# Patient Record
Sex: MALE | Employment: FULL TIME | URBAN - METROPOLITAN AREA
[De-identification: names, ages, dates, MRNs, and addresses within clinical notes are randomized per-mention and may not be internally consistent; named-entity substitution may affect disease eponyms.]

---

## 2017-10-31 ENCOUNTER — OFFICE VISIT - HEALTHEAST (OUTPATIENT)
Dept: FAMILY MEDICINE | Facility: CLINIC | Age: 32
End: 2017-10-31

## 2017-10-31 DIAGNOSIS — Z00.00 HEALTH CARE MAINTENANCE: ICD-10-CM

## 2017-10-31 DIAGNOSIS — D22.9 MULTIPLE NEVI: ICD-10-CM

## 2017-10-31 DIAGNOSIS — L30.9 DERMATITIS: ICD-10-CM

## 2017-10-31 LAB
CHOLEST SERPL-MCNC: 150 MG/DL
FASTING STATUS PATIENT QL REPORTED: YES
HDLC SERPL-MCNC: 31 MG/DL
LDLC SERPL CALC-MCNC: 84 MG/DL
TRIGL SERPL-MCNC: 175 MG/DL

## 2017-10-31 ASSESSMENT — MIFFLIN-ST. JEOR: SCORE: 2351.47

## 2017-11-03 ENCOUNTER — COMMUNICATION - HEALTHEAST (OUTPATIENT)
Dept: FAMILY MEDICINE | Facility: CLINIC | Age: 32
End: 2017-11-03

## 2017-11-03 LAB — TOTAL IGE - HISTORICAL: 228 KU/L (ref 0–100)

## 2017-11-15 ENCOUNTER — COMMUNICATION - HEALTHEAST (OUTPATIENT)
Dept: FAMILY MEDICINE | Facility: CLINIC | Age: 32
End: 2017-11-15

## 2018-02-26 ENCOUNTER — RECORDS - HEALTHEAST (OUTPATIENT)
Dept: ADMINISTRATIVE | Facility: OTHER | Age: 33
End: 2018-02-26

## 2019-01-21 ENCOUNTER — OFFICE VISIT - HEALTHEAST (OUTPATIENT)
Dept: FAMILY MEDICINE | Facility: CLINIC | Age: 34
End: 2019-01-21

## 2019-01-21 ENCOUNTER — COMMUNICATION - HEALTHEAST (OUTPATIENT)
Dept: TELEHEALTH | Facility: CLINIC | Age: 34
End: 2019-01-21

## 2019-01-21 DIAGNOSIS — L30.9 DERMATITIS: ICD-10-CM

## 2019-01-21 DIAGNOSIS — Z13.220 LIPID SCREENING: ICD-10-CM

## 2019-01-21 DIAGNOSIS — E66.09 CLASS 2 OBESITY DUE TO EXCESS CALORIES WITHOUT SERIOUS COMORBIDITY WITH BODY MASS INDEX (BMI) OF 37.0 TO 37.9 IN ADULT: ICD-10-CM

## 2019-01-21 DIAGNOSIS — E66.812 CLASS 2 OBESITY DUE TO EXCESS CALORIES WITHOUT SERIOUS COMORBIDITY WITH BODY MASS INDEX (BMI) OF 37.0 TO 37.9 IN ADULT: ICD-10-CM

## 2019-01-21 DIAGNOSIS — Z00.00 ROUTINE GENERAL MEDICAL EXAMINATION AT A HEALTH CARE FACILITY: ICD-10-CM

## 2019-01-21 DIAGNOSIS — Z13.1 DIABETES MELLITUS SCREENING: ICD-10-CM

## 2019-01-21 RX ORDER — TRIAMCINOLONE ACETONIDE 1 MG/G
CREAM TOPICAL 2 TIMES DAILY
Qty: 45 G | Refills: 1 | Status: SHIPPED | OUTPATIENT
Start: 2019-01-21 | End: 2022-09-22

## 2019-01-21 ASSESSMENT — MIFFLIN-ST. JEOR: SCORE: 2356.12

## 2019-01-23 ENCOUNTER — AMBULATORY - HEALTHEAST (OUTPATIENT)
Dept: LAB | Facility: CLINIC | Age: 34
End: 2019-01-23

## 2019-01-23 DIAGNOSIS — Z13.220 LIPID SCREENING: ICD-10-CM

## 2019-01-23 DIAGNOSIS — Z13.1 DIABETES MELLITUS SCREENING: ICD-10-CM

## 2019-01-23 LAB
CHOLEST SERPL-MCNC: 153 MG/DL
FASTING STATUS PATIENT QL REPORTED: YES
FASTING STATUS PATIENT QL REPORTED: YES
GLUCOSE BLD-MCNC: 62 MG/DL (ref 70–125)
HBA1C MFR BLD: 5.4 % (ref 3.5–6)
HDLC SERPL-MCNC: 36 MG/DL
LDLC SERPL CALC-MCNC: 85 MG/DL
TRIGL SERPL-MCNC: 161 MG/DL

## 2019-01-28 ENCOUNTER — COMMUNICATION - HEALTHEAST (OUTPATIENT)
Dept: FAMILY MEDICINE | Facility: CLINIC | Age: 34
End: 2019-01-28

## 2019-04-17 ENCOUNTER — OFFICE VISIT - HEALTHEAST (OUTPATIENT)
Dept: FAMILY MEDICINE | Facility: CLINIC | Age: 34
End: 2019-04-17

## 2019-04-17 DIAGNOSIS — N45.1 EPIDIDYMITIS: ICD-10-CM

## 2019-04-17 RX ORDER — IBUPROFEN 800 MG/1
800 TABLET, FILM COATED ORAL
Status: SHIPPED | COMMUNITY
Start: 2014-01-12 | End: 2022-09-22

## 2019-04-17 ASSESSMENT — MIFFLIN-ST. JEOR: SCORE: 2327.94

## 2020-01-07 ENCOUNTER — OFFICE VISIT - HEALTHEAST (OUTPATIENT)
Dept: ALLERGY | Facility: CLINIC | Age: 35
End: 2020-01-07

## 2020-01-07 DIAGNOSIS — Z01.82 ENCOUNTER FOR ALLERGY TESTING: ICD-10-CM

## 2020-01-07 DIAGNOSIS — L50.9 HIVES: ICD-10-CM

## 2020-01-07 ASSESSMENT — MIFFLIN-ST. JEOR: SCORE: 2340.13

## 2021-02-11 ENCOUNTER — RECORDS - HEALTHEAST (OUTPATIENT)
Dept: GENERAL RADIOLOGY | Facility: CLINIC | Age: 36
End: 2021-02-11

## 2021-02-11 ENCOUNTER — COMMUNICATION - HEALTHEAST (OUTPATIENT)
Dept: FAMILY MEDICINE | Facility: CLINIC | Age: 36
End: 2021-02-11

## 2021-02-11 ENCOUNTER — OFFICE VISIT - HEALTHEAST (OUTPATIENT)
Dept: FAMILY MEDICINE | Facility: CLINIC | Age: 36
End: 2021-02-11

## 2021-02-11 DIAGNOSIS — L98.9 DERMATOSIS: ICD-10-CM

## 2021-02-11 DIAGNOSIS — R22.32 LOCALIZED SWELLING OF FINGER OF LEFT HAND: ICD-10-CM

## 2021-02-11 DIAGNOSIS — Z00.00 ROUTINE GENERAL MEDICAL EXAMINATION AT A HEALTH CARE FACILITY: ICD-10-CM

## 2021-02-11 DIAGNOSIS — R22.32 LOCALIZED SWELLING, MASS AND LUMP, LEFT UPPER LIMB: ICD-10-CM

## 2021-02-11 DIAGNOSIS — R14.0 ABDOMINAL BLOATING: ICD-10-CM

## 2021-02-11 LAB
ALBUMIN SERPL-MCNC: 4.3 G/DL (ref 3.5–5)
ALP SERPL-CCNC: 79 U/L (ref 45–120)
ALT SERPL W P-5'-P-CCNC: 40 U/L (ref 0–45)
ANION GAP SERPL CALCULATED.3IONS-SCNC: 11 MMOL/L (ref 5–18)
AST SERPL W P-5'-P-CCNC: 28 U/L (ref 0–40)
BILIRUB SERPL-MCNC: 0.7 MG/DL (ref 0–1)
BUN SERPL-MCNC: 20 MG/DL (ref 8–22)
C REACTIVE PROTEIN LHE: 0.2 MG/DL (ref 0–0.8)
CALCIUM SERPL-MCNC: 9.8 MG/DL (ref 8.5–10.5)
CHLORIDE BLD-SCNC: 106 MMOL/L (ref 98–107)
CHOLEST SERPL-MCNC: 140 MG/DL
CO2 SERPL-SCNC: 26 MMOL/L (ref 22–31)
CREAT SERPL-MCNC: 1.01 MG/DL (ref 0.7–1.3)
ERYTHROCYTE [DISTWIDTH] IN BLOOD BY AUTOMATED COUNT: 12.5 % (ref 11–14.5)
ERYTHROCYTE [SEDIMENTATION RATE] IN BLOOD BY WESTERGREN METHOD: 1 MM/HR (ref 0–15)
FASTING STATUS PATIENT QL REPORTED: NO
GFR SERPL CREATININE-BSD FRML MDRD: >60 ML/MIN/1.73M2
GLUCOSE BLD-MCNC: 83 MG/DL (ref 70–125)
HCT VFR BLD AUTO: 47.6 % (ref 40–54)
HDLC SERPL-MCNC: 30 MG/DL
HGB BLD-MCNC: 17 G/DL (ref 14–18)
LDLC SERPL CALC-MCNC: 65 MG/DL
MCH RBC QN AUTO: 30.7 PG (ref 27–34)
MCHC RBC AUTO-ENTMCNC: 35.7 G/DL (ref 32–36)
MCV RBC AUTO: 86 FL (ref 80–100)
PLATELET # BLD AUTO: 213 THOU/UL (ref 140–440)
PMV BLD AUTO: 9.1 FL (ref 7–10)
POTASSIUM BLD-SCNC: 4.2 MMOL/L (ref 3.5–5)
PROT SERPL-MCNC: 7 G/DL (ref 6–8)
RBC # BLD AUTO: 5.53 MILL/UL (ref 4.4–6.2)
SODIUM SERPL-SCNC: 143 MMOL/L (ref 136–145)
TRIGL SERPL-MCNC: 226 MG/DL
WBC: 6.8 THOU/UL (ref 4–11)

## 2021-02-11 ASSESSMENT — MIFFLIN-ST. JEOR: SCORE: 1950.95

## 2021-02-12 LAB — ANA SER QL: 0.3 U

## 2021-02-26 ENCOUNTER — COMMUNICATION - HEALTHEAST (OUTPATIENT)
Dept: FAMILY MEDICINE | Facility: CLINIC | Age: 36
End: 2021-02-26

## 2021-02-26 ENCOUNTER — HOSPITAL ENCOUNTER (OUTPATIENT)
Dept: CT IMAGING | Facility: HOSPITAL | Age: 36
Discharge: HOME | End: 2021-02-26
Attending: FAMILY MEDICINE

## 2021-02-26 DIAGNOSIS — R14.0 ABDOMINAL BLOATING: ICD-10-CM

## 2021-03-02 ENCOUNTER — AMBULATORY - HEALTHEAST (OUTPATIENT)
Dept: FAMILY MEDICINE | Facility: CLINIC | Age: 36
End: 2021-03-02

## 2021-03-02 DIAGNOSIS — N28.1 RENAL CYST: ICD-10-CM

## 2021-03-02 DIAGNOSIS — K76.0 HEPATIC STEATOSIS: ICD-10-CM

## 2021-03-04 ENCOUNTER — HOSPITAL ENCOUNTER (OUTPATIENT)
Dept: ULTRASOUND IMAGING | Facility: HOSPITAL | Age: 36
Discharge: HOME | End: 2021-03-04
Attending: FAMILY MEDICINE

## 2021-03-04 DIAGNOSIS — N28.1 RENAL CYST: ICD-10-CM

## 2021-04-08 ENCOUNTER — AMBULATORY - HEALTHEAST (OUTPATIENT)
Dept: NURSING | Facility: CLINIC | Age: 36
End: 2021-04-08

## 2021-04-29 ENCOUNTER — AMBULATORY - HEALTHEAST (OUTPATIENT)
Dept: NURSING | Facility: CLINIC | Age: 36
End: 2021-04-29

## 2021-05-27 NOTE — PROGRESS NOTES
Assessment/Plan:    1. Epididymitis  Symptoms consistent with epididymitis on the left side.  Discussed given age less than 35 would consider treatment for gonorrhea/chlamydia with Rocephin in addition to doxycycline for 10 days; patient and wife both report monogamous relationship and that Rocephin is unnecessary and declined.  Provided printed information about epididymitis and prescription for doxycycline x10 days.  Will follow-up as needed for persistent symptoms.  - doxycycline (VIBRA-TABS) 100 MG tablet; Take 1 tablet (100 mg total) by mouth 2 (two) times a day for 10 days.  Dispense: 20 tablet; Refill: 0      Follow up: as needed    Adore Mg MD  [facility]    Subjective:    Patient ID: Kiran Arroyo is a 33 y.o. male is here today for swollen testicle and pain    Swollen testicle with pain  -started 3 days ago - in shower, noticed that left testicle was more swollen and tender  -pain is located more on the top  -no hx similar  -no recent trauma  -no dysuria  -no penile discharge  -no fevers  -some headache, possibly from lack of sleep    Patient Active Problem List   Diagnosis     Obesity     Past Medical History:   Diagnosis Date     Obesity 9/9/2016     Past Surgical History:   Procedure Laterality Date     WISDOM TOOTH EXTRACTION       Current Outpatient Medications on File Prior to Visit   Medication Sig Dispense Refill     ibuprofen (ADVIL,MOTRIN) 800 MG tablet Take 800 mg by mouth.       triamcinolone (KENALOG) 0.1 % cream Apply topically 2 (two) times a day. Lightly to affected area 45 g 1     No current facility-administered medications on file prior to visit.      No Known Allergies  Social History     Socioeconomic History     Marital status:      Spouse name: Not on file     Number of children: Not on file     Years of education: Not on file     Highest education level: Not on file   Occupational History     Not on file   Social Needs     Financial resource strain:  "Not on file     Food insecurity:     Worry: Not on file     Inability: Not on file     Transportation needs:     Medical: Not on file     Non-medical: Not on file   Tobacco Use     Smoking status: Never Smoker     Smokeless tobacco: Never Used   Substance and Sexual Activity     Alcohol use: No     Drug use: No     Sexual activity: Yes     Partners: Female   Lifestyle     Physical activity:     Days per week: Not on file     Minutes per session: Not on file     Stress: Not on file   Relationships     Social connections:     Talks on phone: Not on file     Gets together: Not on file     Attends Latter day service: Not on file     Active member of club or organization: Not on file     Attends meetings of clubs or organizations: Not on file     Relationship status: Not on file     Intimate partner violence:     Fear of current or ex partner: Not on file     Emotionally abused: Not on file     Physically abused: Not on file     Forced sexual activity: Not on file   Other Topics Concern     Not on file   Social History Narrative     Not on file     Review of systems is as stated in HPI, and the remainder of system review is otherwise negative.    Objective:      /80   Pulse 90   Ht 6' 3\" (1.905 m)   Wt (!) 289 lb 5 oz (131.2 kg)   BMI 36.16 kg/m      General appearance: awake, NAD, accompanied by wife  HEENT: atraumatic, normocephalic, no scleral icterus or injection, ears and nose grossly normal, moist mucous membranes  Lungs: breathing comfortably on room air  : normal external male genitalia, left testicle mildly swollen compared to right, mild tenderness of left epididymis  Neuro: alert, oriented x3, CNs grossly intact, no focal deficits appreciated  Psych: normal mood/affect/behavior, answering questions appropriately, linear thought process    "

## 2021-05-31 VITALS — HEIGHT: 75 IN | BODY MASS INDEX: 36.62 KG/M2 | WEIGHT: 294.5 LBS

## 2021-06-02 VITALS — BODY MASS INDEX: 36.85 KG/M2 | WEIGHT: 296.4 LBS | HEIGHT: 75 IN

## 2021-06-03 VITALS — HEIGHT: 75 IN | WEIGHT: 289.31 LBS | BODY MASS INDEX: 35.97 KG/M2

## 2021-06-04 VITALS
WEIGHT: 292 LBS | SYSTOLIC BLOOD PRESSURE: 118 MMHG | BODY MASS INDEX: 36.31 KG/M2 | HEART RATE: 98 BPM | HEIGHT: 75 IN | DIASTOLIC BLOOD PRESSURE: 74 MMHG

## 2021-06-05 VITALS
DIASTOLIC BLOOD PRESSURE: 80 MMHG | SYSTOLIC BLOOD PRESSURE: 124 MMHG | WEIGHT: 206.2 LBS | HEIGHT: 75 IN | OXYGEN SATURATION: 99 % | HEART RATE: 90 BPM | BODY MASS INDEX: 25.64 KG/M2

## 2021-06-05 NOTE — PATIENT INSTRUCTIONS - HE
Hives up to 6 weeks after initial event    If happens again, record all events of previous 2-6 hours    Dermatographia    Zyrtec 10 mg (cetirizine)

## 2021-06-05 NOTE — PROGRESS NOTES
Chief complaint: Hives    History of present illness: This is a pleasant 34-year-old gentleman that I was asked to see by Onur Cortez in regards to hives.  He states a few weeks ago he was working at [employer].  He works there as an  overnights.  He got off work around 6 AM.  He states he was trying to hurry and collect his stools.  He is sweating.  It was cold outside and then he went into his car and turned on the warm heat.  He then noted some itching on his head.  He went home and took a shower.  He is been told in the past that he has keratosis pilaris and states that when he rubs up against things sometimes this is aggravated and elevates.  He states that typically the cold helps this.  He took a shower and the itching worsened.  He went to bed and then started to develop hives.  His wife gave him 2 Benadryl.  He had a slight tickle in his throat and some drainage.  He could talk, however.  No wheezing but felt that he was slightly short of breath.  He went to the emergency room exam was normal except for urticaria.  His eyes were swollen face was swollen on the way there but did improve with the Benadryl.  He was given steroids and watched for a period of time and symptoms did improve.  He had hives come and go up to about 3 days after discharge.  He states that night he can think of nothing else unusual that he would have done.  He did not take any over-the-counter medications.  He did have some chili around midnight.  He was not sick at the time and denies any previous episodes of hives.  He does note that sometimes he has a rash that appears in the flexor surfaces of his elbows but he did not have this at the time.  He denies any nasal congestion or drainage at that time.    Past medical history: Otherwise unremarkable    Social history: He is an , has a rabbit at home but had no interaction with the rabbit before the hives appeared, non-smoking environment,  "non-smoker, no exposure to mold at home    Family history: Negative for urticaria    Review of Systems performed as above and the remainder is negative.         Current Outpatient Medications:      EPINEPHrine (EPIPEN/ADRENACLICK/AUVI-Q) 0.3 mg/0.3 mL injection, Inject 0.3 mL (0.3 mg total) as directed as needed for anaphylaxis. Inject into thigh., Disp: 2 Pre-filled Pen Syringe, Rfl: 0     ibuprofen (ADVIL,MOTRIN) 800 MG tablet, Take 800 mg by mouth., Disp: , Rfl:      triamcinolone (KENALOG) 0.1 % cream, Apply topically 2 (two) times a day. Lightly to affected area, Disp: 45 g, Rfl: 1    No Known Allergies    /74   Pulse 98   Ht 6' 3\" (1.905 m)   Wt (!) 292 lb (132.5 kg)   BMI 36.50 kg/m    Gen: Pleasant male not in acute distress  HEENT: Eyes no erythema of the bulbar or palpebral conjunctiva, no edema. Ears: TMs well visualized, no effusions. Nose: No congestion, mucosa normal. Mouth: Throat clear, no lip or tongue edema.   Cardiac: Regular rate and rhythm, no murmurs, rubs or gallops  Respiratory: Clear to auscultation bilaterally, no adventitious breath sounds  Lymph: No supraclavicular or cervical lymphadenopathy  Skin: Dermatographia  Psych: Alert and oriented times 3    Last Percutaneous Allergy Test Results  Trees  Gaurav, White  1:20 H  (W/F in mm): 0 (01/07/20 1453)  Birch Mix 1:20 H (W/F in mm): 0 (01/07/20 1453)  Knott, Common 1:20 H (W/F in mm): 0 (01/07/20 1453)  Elm, American 1:20 H (W/F in mm): 0 (01/07/20 1453)  [place], Shagbark 1:20 H (W/F in mm): 0 (01/07/20 1453)  Maple, Hard/Sugar 1:20 H (W/F in mm): 0 (01/07/20 1453)  [place] Mix 1:20 H (W/F in mm): 0 (01/07/20 1453)  [place], Red 1:20 H (W/F in mm): 0 (01/07/20 1453)  [place], American 1:20 H (W/F in mm): 0 (01/07/20 1453)  Aldrich Tree 1:20 H (W/F in mm): 0 (01/07/20 1453)  Dust Mites  D. Pteronyssinus Mite 30,000 AU/ML H (W/F in mm): 0 (01/07/20 1453)  D. Farinae Mite 30,000 AU/ML H (W/F in mm: 0 (01/07/20 1453)  Grasses  Grass " Mix #4 10,000 BAU/ML H: 0 (01/07/20 1453)  [place] Grass 1:20 H (W/F in mm): 0 (01/07/20 1453)  Cockroach  Cockroach Mix 1:10 H (W/F in mm): 0 (01/07/20 1453)  Molds/Fungi  Alternaria Tenuis 1:10 H (W/F in mm): 0 (01/07/20 1453)  Aspergillus Fumigatus 1:10 H (W/F in mm): 0 (01/07/20 1453)  Homodendrum Cladosporioides 1:10 H (W/F in mm): 0 (01/07/20 1453)  Penicillin Notatum 1:10 H (W/F in mm): 0 (01/07/20 1453)  Epicoccum 1:10 H (W/F in mm): 0 (01/07/20 1453)  Weeds  Ragweed, Short 1:20 H (W/F in mm): 0 (01/07/20 1453)  [place], [place] 1:20 H (W/F in mm): 0 (01/07/20 1453)  Lamb's Quarter 1:20 H (W/F in mm): 0 (01/07/20 1453)  Pigweed, Rough Red Root 1:20 H  (W/F in mm): 0 (01/07/20 1453)  Plantain, English 1:20 H  (W/F in mm): 0 (01/07/20 1453)  Sagebrush, Mugwort 1:20 H  (W/F in mm): 0 (01/07/20 1453)  Animal  Cat 10,000 BAU/ML H (W/F in mm): 0 (01/07/20 1453)  Dog 1:10 H (W/F in mm): 0 (01/07/20 1453)  Controls  Device Type: QUINTIP (01/07/20 1453)  Neg. control: 50% Glycerine/Saline H (W/F in mm): 0/0 (01/07/20 1453)  Pos. control: Histamine 6mg/ML (W/F in mms): 12/21 (01/07/20 1453)      Impression report and plan:  1.  Acute urticaria    This episode of urticaria appears to be unexplained.  Likely viral in origin, however, this is a diagnosis of exclusion.  I went over triggers for urticaria.  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  Explained this to him.  Okay to use cetirizine as needed.  Hives can persist up to 6 weeks after the initial event.  If hives persist beyond 6 weeks, he needs to let me know.  No further work-up at this time.  He should record all of the events of the previous 2 to 6 hours if the hives do recur.  Follow as needed.

## 2021-06-13 NOTE — PROGRESS NOTES
Assessment/Plan:     1. Health care maintenance  Age appropriate health care screening and guidance discussed including nutrition, exercise, immunization updates and vitamin supplementation.   Screening labs and exams ordered below.   - Influenza, Seasonal,Quad Inj, 36+ MOS  - Tdap vaccine,  6yo or older,  IM  - Glucose; Future  - Lipid [place] FASTING  - Glucose    2. Multiple nevi  3. Dermatitis  We will check IgE as below if elevated may consider allergy panel.  Advised patient to keep a log of food and what he is using.  Will try steroid.  Can try Benadryl or Claritin over-the-counter.  Referral to dermatology per his request due to concern for nevi however none look particularly concerning on my evaluation.  - Immunoglobulin E  - Ambulatory referral to Dermatology  - triamcinolone (KENALOG) 0.1 % cream; Apply topically 2 (two) times a day. Lightly to affected area  Dispense: 45 g; Refill: 1    Briefly discussed with patient and wife they are somewhat considering vasectomy.  If he decides should plan to follow-up with Dr. Pa.    The following high BMI interventions were performed this visit: encouragement to exercise and weight loss from baseline weight    Subjective:      Kiran Arroyo is a 32 y.o. male comes in today for annual physical.  He feels well overall good energy.  No concerns with bowel or bladder.  He has an active job and has good exercise tolerance.  Blood pressure is controlled.  Reviewed last labs which were normal with fasting for labs today.  He states that he has been intermittently getting a rash.  He states that it typically happens in the winter.  He thinks it is friction from close is usually on his stomach.  He noticed that if his wife washes his work uniform other than having it monitored at the facility it will help.  Also wearing a T-shirt helps.  He had to recently start wearing long sleeves so now it has spread down his arms he does describe it is itchy.  It is worse than  "it was last night.  No open sores no history of eczema or psoriasis.  He has not put anything on it other than lotions.  He tries to wear fragrance free lotions and creams.  He also is noticing some small black spots coming on his back states that they just are popping up in the last year they do not bother him but he is concerned about them.  He is having another child.  It was unexpected pregnancy.  They are considering their options he is somewhat considering vasectomy but is hesitant.  Wife is here at appointment today.    Current Outpatient Prescriptions   Medication Sig Dispense Refill     triamcinolone (KENALOG) 0.1 % cream Apply topically 2 (two) times a day. Lightly to affected area 45 g 1     No current facility-administered medications for this visit.      No Known Allergies  Past Medical History, Family History, and Social History reviewed.  Past Medical History:   Diagnosis Date     Obesity 9/9/2016     No past surgical history on file.  Review of patient's allergies indicates no known allergies.  Family History   Problem Relation Age of Onset     No Medical Problems Mother      Diabetes Maternal Grandmother      Breast cancer Maternal Grandmother      Cancer Paternal Grandmother      Colon cancer Paternal Grandfather      Social History     Social History     Marital status:      Spouse name: N/A     Number of children: N/A     Years of education: N/A     Occupational History     Not on file.     Social History Main Topics     Smoking status: Never Smoker     Smokeless tobacco: Not on file     Alcohol use No     Drug use: No     Sexual activity: Yes     Partners: Female     Other Topics Concern     Not on file     Social History Narrative         Review of systems is as stated in HPI, and the remainder of the 10 system review is otherwise negative.    Objective:     Vitals:    10/31/17 1007   BP: 114/80   Pulse: 84   Weight: (!) 294 lb 8 oz (133.6 kg)   Height: 6' 3\" (1.905 m)    Body mass index " is 36.81 kg/(m^2).  Wt Readings from Last 3 Encounters:   10/31/17 (!) 294 lb 8 oz (133.6 kg)   09/09/16 (!) 289 lb 6.4 oz (131.3 kg)       General Appearance:    Alert, cooperative, no distress, appears stated age    Head:    Normocephalic, without obvious abnormality, atraumatic   Eyes:    PERRL, EOM's intact, no conjunctivitis    Ears:    Normal TM's and external ear canals   Nose:   Mucosa normal, no drainage     or sinus tenderness   Throat:   Oropharynx is clear   Neck:   Supple, symmetrical, no adenopathy, no thyromegally, no carotid bruit        Lungs:     Clear to auscultation bilaterally, respirations unlabored   Chest Wall:    No tenderness or deformity    Heart:    Regular rate and rhythm, S1 and S2 normal, no murmur, rub    or gallop       Abdomen:     Soft, non-tender, bowel sounds active all four quadrants,     no masses, no organomegaly           Extremities:   Extremities normal, atraumatic, no cyanosis or edema   Pulses:   2+ and symmetric all extremities   Neuro:   cranial nerves grossly intact, grossly normal sensation and reflexes in extremities    Psych:   grossly normal mood and affect without acute anxiety or psychosis    Skin:  Patient has small maculopapular rash on arms and lower part of abdomen.  There is slight hyperpigmented small macules on back.  No lesions are significantly concerning for malignancy.  Otherwise no rashes or lesions   :          This note has been dictated using voice recognition software. Any grammatical or context distortions are unintentional and inherent to the software.

## 2021-06-15 NOTE — PROGRESS NOTES
ASSESSMENT/PLAN:  1. Routine general medical examination at a health care facility  Healthy 34 yo male.  Encourage healthy eating and exercise. Fasting glucose and lipid cascade are ordered  - Lipid [place] FASTING    2. Localized swelling of finger of left hand  Laboratory workup for conditions causing finger swelling including RA are all negative, xray does not reveal significant arthritis.  Reassurance is provided, return if sxs recur  - HM2(CBC w/o Differential)  - Antinuclear Antibody (STUART) Cascade  - Sedimentation Rate  - C-Reactive Protein(CRP)  - XR Hand Left 3 or More VWS; Future    3. Dermatosis      4. Abdominal bloating  Unusual abdominal bloating. Differential includes constipation, dietary intolerance, weight gain. Less likely malignancy.  With gradual onset and notable distention on exam, will order labs and CT.    - Comprehensive Metabolic Panel  - CT Abdomen With Oral With IV Contrast; Future      Orders Placed This Encounter   Procedures     XR Hand Left 3 or More VWS     Standing Status:   Future     Number of Occurrences:   1     Standing Expiration Date:   2/11/2022     Order Specific Question:   Reason for Exam (Describe Symptoms):     Answer:   3rd digit swelling,  joint pains     Order Specific Question:   Can the procedure be changed per Radiologist protocol?     Answer:   Yes     CT Abdomen With Oral With IV Contrast     Standing Status:   Future     Standing Expiration Date:   2/11/2022     Order Specific Question:   Reason for Exam (Describe Symptoms):     Answer:   abodminal bloating, tender right abdomen, periumbilical     Order Specific Question:   Can the procedure be changed per Radiologist protocol?     Answer:   Yes     Order Specific Question:   If this is a diagnostic procedure, have the patient's age and recent imaging history been considered?     Answer:   Yes     Influenza, Seasonal Quad, PF =/> 6months     HM2(CBC w/o Differential)     Lipid [place] FASTING     Order Specific  Question:   Fasting is required?     Answer:   Yes     Antinuclear Antibody (STUART) Cascade     Sedimentation Rate     C-Reactive Protein(CRP)     Comprehensive Metabolic Panel     There are no discontinued medications.      CHIEF COMPLAINT:  Chief Complaint   Patient presents with     Annual Exam     Finger Swelling     Left Middle finger       HISTORY OF PRESENT ILLNESS:  Kiran is a 35 y.o. male presenting to the clinic today for an annual exam. Overall he is doing well.  He did have an episode of middle finger swelling on right hand that was quite notable. No injury, still mildly swollen and painful.  No history of similar symptoms, no other joint involvement or family history of rheumatologic conditions.  Now noting abdominal swelling, has been occurring gradually over the last couple months. He is uncomfortable in his abdomen. Denies, diarrhea, constipation, or blood in his stools. No family hx colon cancer    Remainder of 12-point ROS is negative.    Social History     Tobacco Use   Smoking Status Never Smoker   Smokeless Tobacco Never Used       Family History   Problem Relation Age of Onset     No Medical Problems Mother      Diabetes Maternal Grandmother      Breast cancer Maternal Grandmother      Cancer Paternal Grandmother      Colon cancer Paternal Grandfather        Social History     Socioeconomic History     Marital status:      Spouse name: Not on file     Number of children: Not on file     Years of education: Not on file     Highest education level: Not on file   Occupational History     Not on file   Social Needs     Financial resource strain: Not on file     Food insecurity     Worry: Not on file     Inability: Not on file     Transportation needs     Medical: Not on file     Non-medical: Not on file   Tobacco Use     Smoking status: Never Smoker     Smokeless tobacco: Never Used   Substance and Sexual Activity     Alcohol use: No     Drug use: No     Sexual activity: Yes     Partners:  "Female   Lifestyle     Physical activity     Days per week: Not on file     Minutes per session: Not on file     Stress: Not on file   Relationships     Social connections     Talks on phone: Not on file     Gets together: Not on file     Attends Rastafari service: Not on file     Active member of club or organization: Not on file     Attends meetings of clubs or organizations: Not on file     Relationship status: Not on file     Intimate partner violence     Fear of current or ex partner: Not on file     Emotionally abused: Not on file     Physically abused: Not on file     Forced sexual activity: Not on file   Other Topics Concern     Not on file   Social History Narrative     Not on file       Past Surgical History:   Procedure Laterality Date     WISDOM TOOTH EXTRACTION         No Known Allergies    Active Ambulatory Problems     Diagnosis Date Noted     Obesity 09/09/2016     Dermatosis 02/11/2021     Resolved Ambulatory Problems     Diagnosis Date Noted     No Resolved Ambulatory Problems     No Additional Past Medical History       VITALS:  Vitals:    02/11/21 1115   BP: 124/80   Patient Site: Right Arm   Patient Position: Sitting   Cuff Size: Adult Large   Pulse: 90   SpO2: 99%   Weight: 206 lb 3.2 oz (93.5 kg)   Height: 6' 3\" (1.905 m)     Wt Readings from Last 3 Encounters:   02/11/21 206 lb 3.2 oz (93.5 kg)   01/07/20 (!) 292 lb (132.5 kg)   12/29/19 (!) 292 lb (132.5 kg)     Body mass index is 25.77 kg/m .    PHYSICAL EXAM:  General Appearance: Alert, cooperative, no distress, appears stated age  Head: Normocephalic, without obvious abnormality, atraumatic  Eyes: PERRL, conjuctiva/corneas clear, EPM's intact, fundi benign, both eyes  Ears: Normal TM's and external ear canals, both ears  Nose: Nares normal, septum midline, mucosa normal, no drainage or sinus tenderness  Throat: Lips, mucosa, and tongue normal; teeth and gums normal  Neck: Supple, symmetrical, trachea midline, no adenopathy;      thyroid: " No enlargements/tenderness/nodules; no carotid bruit or JVD  Back: Symmetric, no curvature, ROM normal, no CVA tenderness  Lungs: Clear to auscultation bilaterally, respirations unlabored  Chest Wall: No tenderness or deformity  Heart: Regular rate and rhythm, S1 and S2 normal, no murmur, rub or gallop  Abdomen: Soft, generally tender with distention questionable mass vs fullness in upper abdomen.  Extremities: Extremities normal, atraumatic, no cyanosis or edema  Pulses: 2+ and symmetric all extremities  Skin: Skin color, texture, turgor normal, no rashes or lesions  Lymph Nodes: cervical, supraclavicular, and axillary nodes normal  Neurologic: CNIII-XII intact. Normal strength, sensation and reflexes       throughout    RECENT RESULTS  No results found for this or any previous visit (from the past 48 hour(s)).    MEDICATIONS:  Current Outpatient Medications   Medication Sig Dispense Refill     EPINEPHrine (EPIPEN/ADRENACLICK/AUVI-Q) 0.3 mg/0.3 mL injection Inject 0.3 mL (0.3 mg total) as directed as needed for anaphylaxis. Inject into thigh. 2 Pre-filled Pen Syringe 0     ibuprofen (ADVIL,MOTRIN) 800 MG tablet Take 800 mg by mouth.       triamcinolone (KENALOG) 0.1 % cream Apply topically 2 (two) times a day. Lightly to affected area 45 g 1     No current facility-administered medications for this visit.

## 2021-06-15 NOTE — TELEPHONE ENCOUNTER
----- Message from Milagros Knox MD sent at 2/11/2021 12:37 PM CST -----  Your xray of your hand is normal- no chronic arthritis or bony abnormalities

## 2021-06-15 NOTE — TELEPHONE ENCOUNTER
I reached out to pt and no answer. I left a vm. If pt returns call, please relay result message below from Dr. Knox.

## 2021-06-16 PROBLEM — K76.0 HEPATIC STEATOSIS: Status: ACTIVE | Noted: 2021-03-02

## 2021-06-16 PROBLEM — L98.9 DERMATOSIS: Status: ACTIVE | Noted: 2021-02-11

## 2021-06-17 NOTE — PATIENT INSTRUCTIONS - HE
Patient Instructions by Adore Mg MD at 4/17/2019  2:40 PM     Author: Adore Mg MD Service: -- Author Type: Physician    Filed: 4/17/2019  3:05 PM Encounter Date: 4/17/2019 Status: Signed    : Adore Mg MD (Physician)       Patient Education     Epididymitis  Inflammation of the epididymis can cause pain and swelling in your scrotum. The epididymis is a small tube next to the testicle that stores sperm. Epididymitis is usually caused by an infection. In sexually active men, it is often caused by a sexually transmitted disease (STD) such as chlamydia or gonorrhea. In boys and in men over 40, it can be from bacteria from other parts of the urinary tract (not an STD infection).  Symptoms may begin with pain in the lower belly (abdomen) or low back. The pain then spreads down into the scrotum. Usually only one side is affected. The testicle and scrotum swell and become very painful and red. You may have fever and a burning when passing urine. Sometimes you may have a discharge from the penis.  Treatment is with antibiotics, and anti-inflammatory and pain medicines. The condition should get better over the first few days of treatment. But it will take several weeks for all the swelling and discomfort to go away. If your healthcare provider suspects that an STD is the cause, your sexual partners may need to be treated.  Home care  The following will help you care for yourself at home:    Support the scrotum. When lying down, place a rolled towel under the scrotum. When walking, use an athletic supporter or 2 pairs of jockey-style underwear.    To relieve pain, put ice packs on the inflamed area. You can make your own ice pack by putting ice cubes in a sealed plastic bag wrapped in a thin towel.    You may use over-the-counter medicines to control pain, unless another medicine was given. If you have chronic liver or kidney disease, talk with your healthcare provider before taking these  medicines. Also talk with your provider if you've ever had a stomach ulcer or GI bleeding.    Rest in bed for the first few days until the fever, pain, and swelling get better. It may take several weeks for all of the swelling to go away.    Constipation can make you strain. This makes the pain worse. Avoid constipation by eating natural laxatives such as prunes, fresh fruits, and whole-grain cereals. If necessary, use a mild over-the-counter laxative for constipation. Mineral oil can be used to keep the stools soft.    Do not have sex until you have finished all treatment and all symptoms have cleared.    Take all medicine as directed. Do not miss any doses and do not stop early even if you feel better.  Follow-up care  Follow up with your healthcare provider, or as advised, to be sure you are responding properly to treatment. If a culture was taken, you may call for the result as directed. A culture test can ensure that you are on the correct antibiotic.   When to seek medical advice  Call your healthcare provider right away if any of these occur:    Fever of 100.4 F (38 C), or as directed by your healthcare provider    Increasing pain or swelling of the testicle after starting treatment    Pressure or pain in your bladder that gets worse    Unable to pass urine for 8 hours

## 2021-06-18 NOTE — LETTER
Letter by Cassandra Trinh CNP at      Author: Cassandra Trinh CNP Service: -- Author Type: --    Filed:  Encounter Date: 1/28/2019 Status: (Other)       Kiran Arroyo  [address] [place] [address]             January 28, 2019         Dear Mr. Arroyo,    Below are the results from your recent visit:    Resulted Orders   Glucose   Result Value Ref Range    Glucose 62 (L) 70 - 125 mg/dL    Patient Fasting > 8hrs? Yes     Narrative    Fasting Glucose reference range is 70-99 mg/dL per  American Diabetes Association (ADA) guidelines.   Lipid Cascade   Result Value Ref Range    Cholesterol 153 <=199 mg/dL    Triglycerides 161 (H) <=149 mg/dL    HDL Cholesterol 36 (L) >=40 mg/dL    LDL Calculated 85 <=129 mg/dL    Patient Fasting > 8hrs? Yes    Glycosylated Hemoglobin A1c   Result Value Ref Range    Hemoglobin A1c 5.4 3.5 - 6.0 %       Your blood sugar is a little low.  I recommend consuming small snacks every 3-4 hours to maintain a healthy blood sugar.  I checked a hemoglobin A1C (your average blood sugar over 3 months) and you do not have diabetes.      Your triglycerides are mildly elevated.  Increasing your activity level and avoiding alcohol will help lower this.      Please call with questions or contact us using [employer].    Sincerely,        Electronically signed by Cassandra Trinh CNP

## 2021-06-23 NOTE — PROGRESS NOTES
Assessment and Plan:     1. Routine general medical examination at a health care facility  Discussed consuming a healthy diet and exercising.  He is up-to-date on vaccinations.    2. Diabetes mellitus screening  Check fasting glucose and A1c.  - Glucose; Future  - Glycosylated Hemoglobin A1c; Future    3. Lipid screening  He will follow-up for fasting labs.  - Lipid Cascade; Future    4. Dermatitis  Patient has had a rash the dermatology diagnosis keratosis pilaris.  He finds improvement with triamcinolone cream that he uses as needed.  - triamcinolone (KENALOG) 0.1 % cream; Apply topically 2 (two) times a day. Lightly to affected area  Dispense: 45 g; Refill: 1    5. Class 2 obesity due to excess calories without serious comorbidity with body mass index (BMI) of 37.0 to 37.9 in adult  The following high BMI interventions were performed this visit: encouragement to exercise and lifestyle education regarding diet      Subjective:     Kiran is a 33 y.o. male presenting to the clinic for a female physical.  Patient has been  for 9-1/2 years.  He has 3 children ages 13, 9, 1.  The oldest is a boy and the other two children are girls. His wife is currently pregnant.  They are trying to have another boy.  He has no concerns for STDs.  He denies consuming a healthy diet.  He has not been exercising.  He works as an  for [employer].  Patient saw dermatology in the past for a rash suspected to be keratosis pilaris.  Patient will have intermittent symptoms which improved with hydrocortisone cream.  He is not fasting today but would like to follow-up for fasting labs.  He has a family history of diabetes.    Review of Systems: A complete 14 point review of systems was obtained and is negative or as stated in the history of present illness.    Social History     Socioeconomic History     Marital status:      Spouse name: Not on file     Number of children: Not on file     Years of  "education: Not on file     Highest education level: Not on file   Social Needs     Financial resource strain: Not on file     Food insecurity - worry: Not on file     Food insecurity - inability: Not on file     Transportation needs - medical: Not on file     Transportation needs - non-medical: Not on file   Occupational History     Not on file   Tobacco Use     Smoking status: Never Smoker   Substance and Sexual Activity     Alcohol use: No     Drug use: No     Sexual activity: Yes     Partners: Female   Other Topics Concern     Not on file   Social History Narrative     Not on file       Active Ambulatory Problems     Diagnosis Date Noted     Obesity 09/09/2016     Resolved Ambulatory Problems     Diagnosis Date Noted     No Resolved Ambulatory Problems     Past Medical History:   Diagnosis Date     Obesity 9/9/2016       Family History   Problem Relation Age of Onset     No Medical Problems Mother      Diabetes Maternal Grandmother      Breast cancer Maternal Grandmother      Cancer Paternal Grandmother      Colon cancer Paternal Grandfather        Objective:     Pulse 84   Ht 6' 2.75\" (1.899 m)   Wt (!) 296 lb 6.4 oz (134.4 kg)   SpO2 98%   BMI 37.30 kg/m      General Appearance:    Alert, cooperative, no distress, appears stated age   Head:    Normocephalic, without obvious abnormality, atraumatic   Eyes:    PERRL, conjunctiva/corneas clear, EOM's intact, fundi     benign, both eyes        Ears:    Normal TM's and external ear canals, both ears   Nose:   Nares normal, septum midline, mucosa normal, no drainage    or sinus tenderness   Throat:   Lips, mucosa, and tongue normal; teeth and gums normal   Neck:   Supple, symmetrical, trachea midline, no adenopathy;        thyroid:  No enlargement/tenderness/nodules; no carotid    bruit or JVD   Back:     Symmetric, no curvature, ROM normal, no CVA tenderness   Lungs:     Clear to auscultation bilaterally, respirations unlabored   Chest wall:    No tenderness or " deformity   Heart:    Regular rate and rhythm, S1 and S2 normal, no murmur, rub    or gallop   Abdomen:     Soft, non-tender, bowel sounds active all four quadrants,     no masses, no organomegaly   Genitalia:    Deferred per patient    Rectal:    deferred   Extremities:   Extremities normal, atraumatic, no cyanosis or edema   Pulses:   2+ and symmetric all extremities   Skin:   Skin color, texture, turgor normal, no rashes or lesions   Lymph nodes:   Cervical, supraclavicular, and axillary nodes normal   Neurologic:   CNII-XII intact. Normal strength, sensation and reflexes       throughout

## 2021-06-26 ENCOUNTER — HEALTH MAINTENANCE LETTER (OUTPATIENT)
Age: 36
End: 2021-06-26

## 2021-10-11 ENCOUNTER — HEALTH MAINTENANCE LETTER (OUTPATIENT)
Age: 36
End: 2021-10-11

## 2022-03-27 ENCOUNTER — HEALTH MAINTENANCE LETTER (OUTPATIENT)
Age: 37
End: 2022-03-27

## 2022-09-22 ENCOUNTER — OFFICE VISIT (OUTPATIENT)
Dept: FAMILY MEDICINE | Facility: CLINIC | Age: 37
End: 2022-09-22
Payer: COMMERCIAL

## 2022-09-22 VITALS
TEMPERATURE: 99.3 F | OXYGEN SATURATION: 97 % | RESPIRATION RATE: 14 BRPM | HEART RATE: 87 BPM | WEIGHT: 301.5 LBS | HEIGHT: 75 IN | SYSTOLIC BLOOD PRESSURE: 128 MMHG | DIASTOLIC BLOOD PRESSURE: 86 MMHG | BODY MASS INDEX: 37.49 KG/M2

## 2022-09-22 DIAGNOSIS — L29.3 PENILE PRURITUS: Primary | ICD-10-CM

## 2022-09-22 PROCEDURE — 87529 HSV DNA AMP PROBE: CPT | Performed by: FAMILY MEDICINE

## 2022-09-22 PROCEDURE — 99213 OFFICE O/P EST LOW 20 MIN: CPT | Performed by: FAMILY MEDICINE

## 2022-09-22 RX ORDER — CLOTRIMAZOLE 1 %
CREAM (GRAM) TOPICAL 2 TIMES DAILY
Qty: 30 G | Refills: 0 | Status: CANCELLED | OUTPATIENT
Start: 2022-09-22

## 2022-09-22 ASSESSMENT — PAIN SCALES - GENERAL: PAINLEVEL: NO PAIN (0)

## 2022-09-22 NOTE — PATIENT INSTRUCTIONS
You can try clotrimazole over the counter twice daily.    Clean the area with water and try to keep it dry.    For itching or pain you can apply topical 1% hydrocortisone over the counter twice a day for no long than 1 week at a time.

## 2022-09-22 NOTE — PROGRESS NOTES
Assessment & Plan   1. Penile pruritus: patient is a 37 year-old uncircumcised male without significant medical history presenting for penile pruritus with associated rash. Based on HPI, ROS, and physical exam differential diagnoses include but are not limited to balanitis, HSV, etc. low suspicion for STI including gonorrhea, chlamydia, and syphilis causing current symptoms. Will check for HSV, results pending. Discussed adequate hygiene with use of warm water. Offered use over-the-counter clotrimazole and controlled use of 1% corticosteroid cream twice daily for 5-7 days if itching/pain is persistent. Patient understands and is agreeable to plan.   - Herpes Simplex Virus 1&2 by PCR    Return in about 2 weeks (around 10/6/2022), or if symptoms worsen or fail to improve.    Eddy Butt MD  [facility]    This chart is completed utilizing dictation software; typos and/or incorrect word substitutions may unintentionally occur.     Juliano Beck is a 37 year old presenting for the following health issues:  Red spots on the tip of Penis (Has had this before in his early 20's treated with cortizone)    History of Present Illness       Reason for visit:  Possible signs of irritation of the penis head  Symptom onset:  3-7 days ago  Symptom intensity:  Moderate  Symptom progression:  Staying the same  Had these symptoms before:  Yes  Has tried/received treatment for these symptoms:  Yes  Previous treatment was successful:  No  What makes it worse:  Redness increasing  What makes it better:  No    He eats 0-1 servings of fruits and vegetables daily.He consumes 1 sweetened beverage(s) daily.He exercises with enough effort to increase his heart rate 10 to 19 minutes per day.  He exercises with enough effort to increase his heart rate 3 or less days per week. He is missing 4 dose(s) of medications per week.     Presents with concerns of red, itchy, and slightly painful penile head  "for the past week. Patient first noted redness with spots on the tip of his penis roughly 1 weeks ago while he was at work going to the bathroom. He reports that when he was urinating he experiencing pain and burning on the tip of his penis, believes it was due to urine contact to skin. He does not believe that the head of his penis is swollen.     He is in a monogenous relationship with his wife. States that he had experienced similar symptoms in his early 20s but self-resolved.     Has been trying to clean the area initially with soap.  He was concerned this may be worsening the [place] and has since to use baby wipes and simply water.    Denies abdominal pain, constipation, diarrhea, nausea, vomiting, testicular pain, and penile discharge.     Review of Systems   CONSTITUTIONAL: NEGATIVE for fever, chills, change in weight  INTEGUMENTARY/SKIN: NEGATIVE for worrisome moles or lesions and POSITIVE for rash penile head  ENT/MOUTH: NEGATIVE for ear, mouth and throat problems  RESP: NEGATIVE for significant cough or SOB  CV: NEGATIVE for chest pain, palpitations or peripheral edema  GI: NEGATIVE for nausea, abdominal pain, heartburn, or change in bowel habits  : negative for dysuria, hematuria, decreased urinary stream, erectile dysfunction and positive for pruritis of penile head and mild burning of skin following urination  PSYCHIATRIC: NEGATIVE for changes in mood or affect   ROS otherwise negative      Objective    /86   Pulse 87   Temp 99.3  F (37.4  C) (Temporal)   Resp 14   Ht 1.915 m (6' 3.39\")   Wt 136.8 kg (301 lb 8 oz)   SpO2 97%   BMI 37.29 kg/m    Body mass index is 37.29 kg/m .  Physical Exam   GENERAL: healthy, alert and no distress.  Accompanied by son.  RESP: lungs clear to auscultation - no rales, rhonchi or wheezes  CV: regular rate and rhythm, normal S1 S2, no S3 or S4, no murmur, click or rub, no peripheral edema and peripheral pulses strong   (male): Intermittent papules over " the glans of the penis and foreskin.  Some appear to be vesicular, while others appear ruptured and with ulcerations.  Otherwise normal male genitalia without lesions or urethral discharge, no hernia  PSYCH: mentation appears normal, affect normal/bright    Labs: pending

## 2022-09-23 LAB
HSV1 DNA SPEC QL NAA+PROBE: NOT DETECTED
HSV2 DNA SPEC QL NAA+PROBE: NOT DETECTED

## 2022-09-23 NOTE — RESULT ENCOUNTER NOTE
Please inform of results if patient has not viewed in [employer] within 3 business days.    Your herpes test was normal.     I would treat this as Balanitis as discussed     Please call the clinic with any questions you may have.     Have a great day,    Dr. Agustin

## 2022-09-25 ENCOUNTER — HEALTH MAINTENANCE LETTER (OUTPATIENT)
Age: 37
End: 2022-09-25

## 2023-04-19 ENCOUNTER — OFFICE VISIT (OUTPATIENT)
Dept: FAMILY MEDICINE | Facility: CLINIC | Age: 38
End: 2023-04-19
Payer: COMMERCIAL

## 2023-04-19 VITALS
DIASTOLIC BLOOD PRESSURE: 88 MMHG | OXYGEN SATURATION: 97 % | HEART RATE: 94 BPM | SYSTOLIC BLOOD PRESSURE: 130 MMHG | TEMPERATURE: 98.1 F | BODY MASS INDEX: 36.47 KG/M2 | WEIGHT: 299.5 LBS | RESPIRATION RATE: 14 BRPM | HEIGHT: 76 IN

## 2023-04-19 DIAGNOSIS — K59.01 SLOW TRANSIT CONSTIPATION: ICD-10-CM

## 2023-04-19 DIAGNOSIS — E66.812 CLASS 2 OBESITY DUE TO EXCESS CALORIES WITHOUT SERIOUS COMORBIDITY WITH BODY MASS INDEX (BMI) OF 36.0 TO 36.9 IN ADULT: ICD-10-CM

## 2023-04-19 DIAGNOSIS — Z11.3 SCREEN FOR STD (SEXUALLY TRANSMITTED DISEASE): ICD-10-CM

## 2023-04-19 DIAGNOSIS — T78.40XA ALLERGIC REACTION, INITIAL ENCOUNTER: ICD-10-CM

## 2023-04-19 DIAGNOSIS — K76.0 HEPATIC STEATOSIS: ICD-10-CM

## 2023-04-19 DIAGNOSIS — Z00.00 ROUTINE GENERAL MEDICAL EXAMINATION AT A HEALTH CARE FACILITY: Primary | ICD-10-CM

## 2023-04-19 DIAGNOSIS — L71.9 ROSACEA: ICD-10-CM

## 2023-04-19 DIAGNOSIS — E66.09 CLASS 2 OBESITY DUE TO EXCESS CALORIES WITHOUT SERIOUS COMORBIDITY WITH BODY MASS INDEX (BMI) OF 36.0 TO 36.9 IN ADULT: ICD-10-CM

## 2023-04-19 LAB
ALBUMIN SERPL-MCNC: 4.3 G/DL (ref 3.4–5)
ALP SERPL-CCNC: 82 U/L (ref 40–150)
ALT SERPL W P-5'-P-CCNC: 55 U/L (ref 0–70)
ANION GAP SERPL CALCULATED.3IONS-SCNC: 6 MMOL/L (ref 3–14)
AST SERPL W P-5'-P-CCNC: 36 U/L (ref 0–45)
BILIRUB SERPL-MCNC: 0.9 MG/DL (ref 0.2–1.3)
BUN SERPL-MCNC: 13 MG/DL (ref 7–30)
C TRACH DNA SPEC QL NAA+PROBE: NEGATIVE
CALCIUM SERPL-MCNC: 9.4 MG/DL (ref 8.5–10.1)
CHLORIDE BLD-SCNC: 105 MMOL/L (ref 94–109)
CHOLEST SERPL-MCNC: 163 MG/DL
CO2 SERPL-SCNC: 26 MMOL/L (ref 20–32)
CREAT SERPL-MCNC: 0.88 MG/DL (ref 0.66–1.25)
FASTING STATUS PATIENT QL REPORTED: YES
GFR SERPL CREATININE-BSD FRML MDRD: >90 ML/MIN/1.73M2
GLUCOSE BLD-MCNC: 93 MG/DL (ref 70–99)
HCV AB SERPL QL IA: NONREACTIVE
HDLC SERPL-MCNC: 37 MG/DL
HIV 1+2 AB+HIV1 P24 AG SERPL QL IA: NONREACTIVE
LDLC SERPL CALC-MCNC: 102 MG/DL
N GONORRHOEA DNA SPEC QL NAA+PROBE: NEGATIVE
NONHDLC SERPL-MCNC: 126 MG/DL
POTASSIUM BLD-SCNC: 4 MMOL/L (ref 3.4–5.3)
PROT SERPL-MCNC: 8 G/DL (ref 6.8–8.8)
SODIUM SERPL-SCNC: 137 MMOL/L (ref 133–144)
T PALLIDUM AB SER QL: NONREACTIVE
TRIGL SERPL-MCNC: 120 MG/DL
TSH SERPL DL<=0.005 MIU/L-ACNC: 0.64 MU/L (ref 0.4–4)

## 2023-04-19 PROCEDURE — 86803 HEPATITIS C AB TEST: CPT | Performed by: FAMILY MEDICINE

## 2023-04-19 PROCEDURE — 87591 N.GONORRHOEAE DNA AMP PROB: CPT | Performed by: FAMILY MEDICINE

## 2023-04-19 PROCEDURE — 80061 LIPID PANEL: CPT | Performed by: FAMILY MEDICINE

## 2023-04-19 PROCEDURE — 86780 TREPONEMA PALLIDUM: CPT | Performed by: FAMILY MEDICINE

## 2023-04-19 PROCEDURE — 99395 PREV VISIT EST AGE 18-39: CPT | Mod: 25 | Performed by: FAMILY MEDICINE

## 2023-04-19 PROCEDURE — 87491 CHLMYD TRACH DNA AMP PROBE: CPT | Performed by: FAMILY MEDICINE

## 2023-04-19 PROCEDURE — 36415 COLL VENOUS BLD VENIPUNCTURE: CPT | Performed by: FAMILY MEDICINE

## 2023-04-19 PROCEDURE — 87389 HIV-1 AG W/HIV-1&-2 AB AG IA: CPT | Performed by: FAMILY MEDICINE

## 2023-04-19 PROCEDURE — 84443 ASSAY THYROID STIM HORMONE: CPT | Performed by: FAMILY MEDICINE

## 2023-04-19 PROCEDURE — 80053 COMPREHEN METABOLIC PANEL: CPT | Performed by: FAMILY MEDICINE

## 2023-04-19 PROCEDURE — 99214 OFFICE O/P EST MOD 30 MIN: CPT | Mod: 25 | Performed by: FAMILY MEDICINE

## 2023-04-19 RX ORDER — EPINEPHRINE 0.3 MG/.3ML
0.3 INJECTION SUBCUTANEOUS PRN
Qty: 2 EACH | Refills: 1 | Status: SHIPPED | OUTPATIENT
Start: 2023-04-19

## 2023-04-19 ASSESSMENT — ENCOUNTER SYMPTOMS
SORE THROAT: 0
PALPITATIONS: 0
HEMATURIA: 0
DIARRHEA: 1
HEADACHES: 1
MYALGIAS: 1
HEMATOCHEZIA: 0
CHILLS: 0
ARTHRALGIAS: 1
HEARTBURN: 1
COUGH: 0
NAUSEA: 0
WEAKNESS: 0
FREQUENCY: 0
FEVER: 0
NERVOUS/ANXIOUS: 0
DYSURIA: 0
CONSTIPATION: 0
DIZZINESS: 0
JOINT SWELLING: 1
SHORTNESS OF BREATH: 0
EYE PAIN: 0
PARESTHESIAS: 0
ABDOMINAL PAIN: 1

## 2023-04-19 ASSESSMENT — PAIN SCALES - GENERAL: PAINLEVEL: NO PAIN (0)

## 2023-04-19 NOTE — RESULT ENCOUNTER NOTE
Please inform of results if patient has not viewed in [employer] within 3 business days.    Your std screen was normal.    Please call the clinic with any questions you may have.     Have a great day,    Dr. Agustin

## 2023-04-19 NOTE — RESULT ENCOUNTER NOTE
"Please inform of results if patient has not viewed in [employer] within 3 business days.    Lipids - Your \"bad\" cholesterol was elevated. This can be improved with diet and exercise. All animal based foods such as meat, dairy, and eggs contain cholesterol. All plant based foods such as fruits, nuts, and vegetables do not.    You do not need a medication for this at this time.    TSH - Your thyroid lab results were normal.    Your syphilis test was normal.    CMP Results - Your blood sugar was 93. Your kidney function, electrolytes, and liver function were normal.    Please call the clinic with any questions you may have.     Have a great day,    Dr. Agustin"

## 2023-04-19 NOTE — PROGRESS NOTES
SUBJECTIVE:   CC: Kiran is an 37 year old who presents for preventative health visit.       4/19/2023     9:44 AM   Additional Questions   Roomed by Michael DUNN   Accompanied by n/a   Patient has been advised of split billing requirements and indicates understanding: Yes  Healthy Habits:     Getting at least 3 servings of Calcium per day:  Yes    Bi-annual eye exam:  Yes    Dental care twice a year:  Yes    Sleep apnea or symptoms of sleep apnea:  Daytime drowsiness and Excessive snoring    Diet:  Low salt, Diabetic, Carbohydrate counting and Other    Frequency of exercise:  2-3 days/week    Duration of exercise:  30-45 minutes    Taking medications regularly:  Yes    Medication side effects:  Not applicable    PHQ-2 Total Score: 1    Additional concerns today:  Yes (fatty liver concern. abdominal pain that he wants to talk about. annual blood work)    Right sided abdominal pain/bloating. Improved by BM. No blood in stool or melena. No prior surgeries. Occurs 1-2 times monthly.    Has not tried anything for this yet.    Needs refill of his EpiPen.    Would like to review previous diagnosis of hepatic steatosis.    Interested in STD screening.    Would like to see a specialist for his rosacea.    Today's PHQ-2 Score:       4/19/2023     9:23 AM   PHQ-2 ( 1999 [employer])   Q1: Little interest or pleasure in doing things 1   Q2: Feeling down, depressed or hopeless 0   PHQ-2 Score 1   Q1: Little interest or pleasure in doing things Several days   Q2: Feeling down, depressed or hopeless Not at all   PHQ-2 Score 1       Have you ever done Advance Care Planning? (For example, a Health Directive, POLST, or a discussion with a medical provider or your loved ones about your wishes): No, advance care planning information given to patient to review.  Patient plans to discuss their wishes with loved ones or provider.      Social History     Tobacco Use     Smoking status: Never     Smokeless tobacco: Never   Vaping Use     Vaping  status: Never Used     Passive vaping exposure: Yes   Substance Use Topics     Alcohol use: No         4/19/2023     9:22 AM   Alcohol Use   Prescreen: >3 drinks/day or >7 drinks/week? No       Last PSA: No results found for: PSA    Reviewed orders with patient. Reviewed health maintenance and updated orders accordingly - Yes  Lab work is in process  BP Readings from Last 3 Encounters:   04/19/23 130/88   09/22/22 128/86   02/11/21 124/80    Wt Readings from Last 3 Encounters:   04/19/23 135.9 kg (299 lb 8 oz)   09/22/22 136.8 kg (301 lb 8 oz)   02/11/21 93.5 kg (206 lb 3.2 oz)                  Patient Active Problem List   Diagnosis     Obesity     Dermatosis     Hepatic steatosis     Impingement syndrome of right shoulder     Sprain of right acromioclavicular joint     Past Surgical History:   Procedure Laterality Date     WISDOM TOOTH EXTRACTION         Social History     Tobacco Use     Smoking status: Never     Smokeless tobacco: Never   Vaping Use     Vaping status: Never Used     Passive vaping exposure: Yes   Substance Use Topics     Alcohol use: No     Family History   Problem Relation Age of Onset     No Known Problems Mother      Diabetes Maternal Grandmother      Breast Cancer Maternal Grandmother      Cancer Paternal Grandmother      Colon Cancer Paternal Grandfather          Current Outpatient Medications   Medication Sig Dispense Refill     EPINEPHrine (ANY BX GENERIC EQUIV) 0.3 MG/0.3ML injection 2-pack Inject 0.3 mLs (0.3 mg) into the muscle as needed for anaphylaxis 2 each 1     No Known Allergies    Reviewed and updated as needed this visit by clinical staff   Tobacco  Allergies  Meds  Problems  Med Hx  Surg Hx  Fam Hx          Reviewed and updated as needed this visit by Provider   Tobacco  Allergies  Meds  Problems  Med Hx  Surg Hx  Fam Hx       Social history reviewed  Past Medical History:   Diagnosis Date     Obesity 9/9/2016      Past Surgical History:   Procedure Laterality  "Date     WISDOM TOOTH EXTRACTION       Review of Systems   Constitutional: Negative for chills and fever.   HENT: Negative for congestion, ear pain and sore throat.    Eyes: Negative for pain and visual disturbance.   Respiratory: Negative for cough and shortness of breath.    Cardiovascular: Positive for peripheral edema. Negative for chest pain and palpitations.   Gastrointestinal: Positive for abdominal pain, diarrhea and heartburn. Negative for constipation, hematochezia and nausea.   Genitourinary: Negative for dysuria, frequency, genital sores, hematuria, impotence, penile discharge and urgency.   Musculoskeletal: Positive for arthralgias, joint swelling and myalgias.   Skin: Positive for rash.   Neurological: Positive for headaches. Negative for dizziness, weakness and paresthesias.   Psychiatric/Behavioral: Positive for mood changes. The patient is not nervous/anxious.      OBJECTIVE:   /88   Pulse 94   Temp 98.1  F (36.7  C) (Temporal)   Resp 14   Ht 1.918 m (6' 3.5\")   Wt 135.9 kg (299 lb 8 oz)   SpO2 97%   BMI 36.94 kg/m      Physical Exam  GENERAL: Obese male.  Healthy, alert and no distress  EYES: Eyes grossly normal to inspection, PERRL and conjunctivae and sclerae normal  HENT: ear canals and TM's normal, nose and mouth without ulcers or lesions  NECK: no adenopathy, no asymmetry, masses, or scars and thyroid normal to palpation  RESP: lungs clear to auscultation - no rales, rhonchi or wheezes  CV: regular rate and rhythm, normal S1 S2, no S3 or S4, no murmur, click or rub, no peripheral edema and peripheral pulses strong  ABDOMEN: soft, nontender, no hepatosplenomegaly, no masses and bowel sounds normal  MS: no gross musculoskeletal defects noted, no edema  SKIN: Rosacea present.  No suspicious lesions or rashes over exposed surfaces.  NEURO: Normal strength and tone, mentation intact and speech normal  PSYCH: mentation appears normal, affect normal/bright    Labs: " pending    ASSESSMENT/PLAN:   1. Routine general medical examination at a health care facility: Discussed personal health and safety. Routine screenings as below. Appropriate anticipatory guidance, vaccinations, and health screening recommendations delivered according to the USPSTF and other appropriate society guidelines.  Patient understands and is agreeable with the plan ordered below.  - Lipid panel reflex to direct LDL Non-fasting; Future  - Comprehensive metabolic panel (BMP + Alb, Alk Phos, ALT, AST, Total. Bili, TP); Future  - HIV Antigen Antibody Combo; Future  - Hepatitis C antibody; Future  - Neisseria gonorrhoeae PCR; Future  - Chlamydia trachomatis PCR; Future  - Treponema Abs w Reflex to RPR and Titer; Future  - TSH with free T4 reflex; Future    2. Allergic reaction, initial encounter: Refills given.  - EPINEPHrine (ANY BX GENERIC EQUIV) 0.3 MG/0.3ML injection 2-pack; Inject 0.3 mLs (0.3 mg) into the muscle as needed for anaphylaxis  Dispense: 2 each; Refill: 1    3. Hepatic steatosis: Encourage weight loss.  Monitor LFTs.  - Lipid panel reflex to direct LDL Non-fasting; Future  - Comprehensive metabolic panel (BMP + Alb, Alk Phos, ALT, AST, Total. Bili, TP); Future    4. Slow transit constipation: Use Metamucil.  No red flag signs.  If not proving should return visit.  Not currently actively having symptoms.  Encourage weight loss.  Check labs as below.  - Comprehensive metabolic panel (BMP + Alb, Alk Phos, ALT, AST, Total. Bili, TP)  - TSH with free T4 reflex    5. Screen for STD (sexually transmitted disease):   - HIV Antigen Antibody Combo  - Hepatitis C antibody  - Neisseria gonorrhoeae PCR  - Chlamydia trachomatis PCR  - Treponema Abs w Reflex to RPR and Titer    6. Rosacea: Referral given as desired.  - Adult Dermatology Referral; Future    7. Class 2 obesity due to excess calories without serious comorbidity with body mass index (BMI) of 36.0 to 36.9 in adult: Encourage diet and exercise  "changes for overall health improvement. Referral given.  - Nutrition Referral; Future      COUNSELING:   Reviewed preventive health counseling, as reflected in patient instructions       Regular exercise       Healthy diet/nutrition       Vision screening       Hearing screening       Consider Hep C screening for all patients one time for ages 18-79 years       Syphilis screening for high risk patients        HIV screeninx in teen years, 1x in adult years, and at intervals if high risk      BMI:   Estimated body mass index is 36.94 kg/m  as calculated from the following:    Height as of this encounter: 1.918 m (6' 3.5\").    Weight as of this encounter: 135.9 kg (299 lb 8 oz).   Weight management plan: Discussed healthy diet and exercise guidelines    He reports that he has never smoked. He has never used smokeless tobacco.    Eddy Butt MD  [facility]    Disclaimer: This note consists of symbols derived from keyboarding, dictation and/or voice recognition software. As a result, there may be errors in the script that have gone undetected. Please consider this when interpreting information found in this chart.    "

## 2023-04-19 NOTE — PATIENT INSTRUCTIONS
Use over the counter metamucil.    Watch for fevers, increasing pain, lack of bowel movements, black tarry stools, or blood in your stools.      Preventive Health Recommendations  Male Ages 26 - 39    Yearly exam:             See your health care provider every year in order to  o   Review health changes.   o   Discuss preventive care.    o   Review your medicines if your doctor has prescribed any.  You should be tested each year for STDs (sexually transmitted diseases), if you re at risk.   After age 35, talk to your provider about cholesterol testing. If you are at risk for heart disease, have your cholesterol tested at least every 5 years.   If you are at risk for diabetes, you should have a diabetes test (fasting glucose).  Shots: Get a flu shot each year. Get a tetanus shot every 10 years.     Nutrition:  Eat at least 5 servings of fruits and vegetables daily.   Eat whole-grain bread, whole-wheat pasta and brown rice instead of white grains and rice.   Get adequate Calcium and Vitamin D.     Lifestyle  Exercise for at least 150 minutes a week (30 minutes a day, 5 days a week). This will help you control your weight and prevent disease.   Limit alcohol to one drink per day.   No smoking.   Wear sunscreen to prevent skin cancer.   See your dentist every six months for an exam and cleaning.

## 2023-04-21 ENCOUNTER — VIRTUAL VISIT (OUTPATIENT)
Dept: NUTRITION | Facility: CLINIC | Age: 38
End: 2023-04-21
Attending: FAMILY MEDICINE
Payer: COMMERCIAL

## 2023-04-21 DIAGNOSIS — Z00.00 ROUTINE GENERAL MEDICAL EXAMINATION AT A HEALTH CARE FACILITY: ICD-10-CM

## 2023-04-21 DIAGNOSIS — E66.812 CLASS 2 OBESITY DUE TO EXCESS CALORIES WITHOUT SERIOUS COMORBIDITY WITH BODY MASS INDEX (BMI) OF 36.0 TO 36.9 IN ADULT: Primary | ICD-10-CM

## 2023-04-21 DIAGNOSIS — E66.09 CLASS 2 OBESITY DUE TO EXCESS CALORIES WITHOUT SERIOUS COMORBIDITY WITH BODY MASS INDEX (BMI) OF 36.0 TO 36.9 IN ADULT: Primary | ICD-10-CM

## 2023-04-21 PROCEDURE — 97802 MEDICAL NUTRITION INDIV IN: CPT | Mod: VID | Performed by: DIETITIAN, REGISTERED

## 2023-04-21 NOTE — LETTER
4/21/2023         RE: Kiran Arroyo  [id number] [facility] [id number]        Dear Dr. Butt,    Thank you for referring your patient, Kiran Arroyo, to the nutrition services at [facility]. I enjoyed meeting with Kiran and creating some wellness goals to help address his weight concerns. I provided some meal plans and recipes along with other nutrition resources related to healthy sleep patterns.  I look forward following up with him and monitoring his progress with quality sleep and nutrition at our follow up consult. Please see a copy of my visit note for more details.       Again, thank you for allowing me to participate in the care of your patient.        Sincerely,        Ave Johnson, RD, LD, CLT

## 2023-04-21 NOTE — PROGRESS NOTES
Medical Nutrition Therapy  Visit Type: Initial assessment and intervention    Visit Details    How would you like to obtain your AVS? mychart   If the correspondence for visit is dropped, how would you like your dietitian to reconnect with you:   call back by phone? Yes   Will anyone else be joining your video visit or telephone call? no5    Type of service:  Video Visit    Start Time: 9:00am    End Time: 10:00am    Originating Location (pt. Location): home    Distant Location (provider location):  [facility] virtual off-site    Platform used for Video Visit: adriana       Referring Provider: Eddy Butt  [facility]     REASON FOR REFERRAL:   Kiran Arroyo is a 37 year old male who is interested in Medical Nutrition Therapy (MNT) and education related to weight management. Would like a better understanding of how his diet can benefit him. He works nights and tries to function the best during the day for his family. He is eating on the go constantly. Trying to function with a normal life with night shitf. He works 10 hour days. The day before his work scheudle. Eats at 7am - rice, beans, chicken, hotdog, spaghetti (any dinner leftovers from day before)  and tries to take power nap before going in. He eats lunch at work 12pm-2am sometimes skips and just goes till eating at home. 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 He will than sleep about 4-5 hours and snack at work. Peanut butter sandwich, leftovers, poptart. When at home normally eats a sandwich or goes out to eat.He doesn't know if he is messing up his metabolism with this schedule. Should he still eat normally sometimes sleeps right after eating.   He is accompanied by self        NUTRITION ASSESSMENT:           4/21/2023     8:17 AM   Neurological   Migraine Headaches Past              4/21/2023     8:17 AM   Gastrointestinal   Diarrhea Past   Gas/bloating Past   Abdominal  Pain Past           4/21/2023     8:17 AM   Food Sensitivities   Lactose intolerance Current          4/21/2023     8:17 AM   Endocrine   Overweight/obesity Current          4/21/2023     8:17 AM   Skin   Acne Current   Hives Current          4/21/2023     8:17 AM   Cardiopulmonary   High Cholesterol Current        Past Medical History:  Past Medical History:   Diagnosis Date     Obesity 9/9/2016       Previous Surgeries:   Past Surgical History:   Procedure Laterality Date     WISDOM TOOTH EXTRACTION          Family History:  Family History   Problem Relation Age of Onset     No Known Problems Mother      Diabetes Maternal Grandmother      Breast Cancer Maternal Grandmother      Cancer Paternal Grandmother      Colon Cancer Paternal Grandfather         Lifestyle History:      4/21/2023     8:17 AM   Lifestyle   Do you feel your life is stressful right now?  Yes   What is the cause(s) of stress in your life?  Work;Health Concerns;Multi-tasking and multiple deadlines to meet;Over thinking and too much analysis   Are you currently implementing any strategies to help manage stress? Yes   What are you doing to manage stress?  TV;Take time for self        Exercise History:      4/21/2023     8:17 AM   Exercise   Does your occupation require extended periods of sitting?  Yes   Does your occupation require extended periods of repetitive movements (ex: walking or lifting)?  Yes   Do you currently participate in any forms of exercise? Yes   Check all the exercises you participate in: Walking;Weight Lifting;Other Cardio   How many times per week do you exercise? 2 times   How long do you usually exercise? 30 min        Sleep History:      4/21/2023     8:17 AM   Sleep   How many hours (on average) do you sleep per night? 4-6   What time do you turn off the lights? 10 PM   How long does it take for you to fall asleep? 30-45 mins   What time do you stop using electronic devices? 10 PM   What time do you wake up? 3 PM   When do  you eat your first meal?  7 PM   Do you feel well-rested during the day?  No   Do you take naps?  Yes   Do you have a comfortable bedroom environment (cool, quiet, dark, etc)? Yes   Do you have a sleep routine/ ritual that you do before bed?  Yes   How many hours do you spend per day looking at a screen (TV, computer, tablet and phone)? 0 to 2   Select all factors that apply to your current sleep habits: Difficulty falling asleep;Wake up in the middle of the night;Sleep eating;Snore loudly or have been told you stop breathing;Eat large meals within 3 hours of going to bed;Night sweats;No control over nighttime eating        Nutrition History:      4/21/2023     8:17 AM   Nutrition   Have you ever had a nutrition consultation? Yes   Do you currently follow a special diet or nutritional program? No   What do you feel are the biggest barriers getting in the way of achieving you nutritional goals? Motivation/Readiness to change;Lack of prep/cooking skills;Work (such as lack of time to eat, unhealthy choices, etc.);Lack of control over emotions/behaviors   Do you have any food allergies, sensitivities or intolerances?  No           4/21/2023     8:17 AM   Digestion   Do you experience stomach pains/cramping? Monthly   Do you experience bloating?  Monthly   Do you experience gas?  2-3 times per week   Do you experience heartburn/acid reflux/indigestion? Weekly   How often do you have a bowel movement? Every other day   What is a typical bowel movement like for you? Select all that apply: Varies a lot          4/21/2023     8:17 AM   Food Access:    Who does the grocery shopping? Spouse/Partner   How often is grocery shopping done? 2 times per week   Where do you usually receive your groceries from? Select all that apply: Walmart;Target;[employer];Costco;[employer];Aldi's;Whole Foods   Do you read food labels? Yes   What do you look at?  Calories;Organic;Fats;Carbs;Fiber;Sugar   Who does the cooking? Select all that apply:  Self;Spouse/Partner   How many meals do you eat out per week?  3 to 5   What restaurants do you typically choose? Bar/Grills          4/21/2023     8:17 AM   Daily Patterns:   How many days per week do you have breakfast? 4   How many days per week do you have lunch? 4   How many days per week do you have dinner? 5   How many days per week do you have snacks? 5          4/21/2023     8:17 AM   Protein Intake:   How many times per day do you typically consume a protein source(s)? 5 or more   What types of protein do you currently eat?  Ground Beef;Beef Ribs;Beef Roast;Beef Hot Dogs;Pork Chops;Pork Ribs;Diaz/Zimbabwean Diaz;Sausage;Other Pork;[place];Tuna;Shrimp;Lobster;Other Fish;Ground Cervantes;Cervantes Chops;Chicken Breast;Ground Chicken;Chicken Sausage;Other Chicken;Turkey Breast;Turkey Thighs;Turkey Diaz           4/21/2023     8:17 AM   Fat Intake:    How many times per day do you typically consume healthy fat(s)? 5 or more   What types of health fats do you currently eat? Select all that apply:  Almonds;Walnuts;Other nuts           4/21/2023     8:17 AM   Fruit Intake:    How many times per day do you typically consume fruits? 3   What types of fruit do currently eat? Apple;Banana;Pears           4/21/2023     8:17 AM   Vegetable Intake:    How many times per day do you typically consume vegetables? 2   What types of vegetables do you currently eat? Asparagus;Beans;Broccoli;Celery;Greens (madiha, kale etc);Spinach;Squash, winter (acorn, butternut);Water chestnuts          4/21/2023     8:17 AM   Grain Intake:    How many times per day do you typically consume grains? 2   What types of grains do currently eat? Select all that apply:  Breads (gluten free);Brown rice (gluten free);Pasta (gluten free)           4/21/2023     8:17 AM   Dairy Intake:    How many times per day do you typically consume dairy? 4   What types of dairy do currently eat? Select all that apply:  Milk;Cheese;Yogurt;Ice cream           4/21/2023      8:17 AM   Non-Dairy Alternative Intake:    How many times per day do you typically consume non-dairy alternatives? 2   What types of non-dairy alternatives do currently eat? Select all that apply:  [place] milk;Pea protein milk;Cashew milk;Goat cheese;Other ice cream           4/21/2023     8:17 AM   Sweets Intake:    How many times per day do you typically consume sweets? 3          4/21/2023     8:17 AM   Beverage Intake:    How many 8 oz cups of water do you typically consume per day?  4 to 5   How many 8 oz cups of caffeine do you typically consume per day?  0   How many drinks of alcohol do you typically consume per week (1 drink = 5 oz wine, 12 oz beer, 1.5 oz spirits)?   0           4/21/2023     8:17 AM   Lifestyle Recall:    What time did you wake up? 9 AM   What time did you go to sleep? 9 AM   What time did you have breakfast? 7-8 AM   Where did you have breakfast?  Work   What time did you have a morning snack? Other   Where did you have your morning snack? Home   What time did you have lunch? Other   Where did you have lunch?  Work   What time did you have an afternoon snack? Other   Where did you have your afternoon snack? Work   What time did you have dinner? Other   Where did you have dinner?  Work   What time did you have an evening snack? 11 PM-12 AM   Where did you have your evening snack? Home   What time of day did you exercise? No Exercise          Additional concerns:   MEDICATIONS:  Current Outpatient Medications   Medication Sig Dispense Refill     EPINEPHrine (ANY BX GENERIC EQUIV) 0.3 MG/0.3ML injection 2-pack Inject 0.3 mLs (0.3 mg) into the muscle as needed for anaphylaxis 2 each 1            View : No data to display.                  ALLERGIES:   No Known Allergies     .na  LABS:  Last Basic Metabolic Panel:  Lab Results   Component Value Date     04/19/2023     02/11/2021      Lab Results   Component Value Date    POTASSIUM 4.0 04/19/2023    POTASSIUM 4.2 02/11/2021     Lab  Results   Component Value Date    CHLORIDE 105 04/19/2023    CHLORIDE 106 02/11/2021     Lab Results   Component Value Date    MARY 9.4 04/19/2023    MARY 9.8 02/11/2021     Lab Results   Component Value Date    CO2 26 04/19/2023    CO2 26 02/11/2021     Lab Results   Component Value Date    BUN 13 04/19/2023    BUN 20 02/11/2021     Lab Results   Component Value Date    CR 0.88 04/19/2023    CR 1.01 02/11/2021     Lab Results   Component Value Date    GLC 93 04/19/2023    GLC 83 02/11/2021    GLC 62 01/23/2019       Last Glucose Profile:   Hemoglobin A1C   Date Value Ref Range Status   01/23/2019 5.4 3.5 - 6.0 % Final       Last Lipid Profile:   Cholesterol   Date Value Ref Range Status   04/19/2023 163 <200 mg/dL Final   02/11/2021 140 <=199 mg/dL Final   01/23/2019 153 <=199 mg/dL Final     Direct Measure HDL   Date Value Ref Range Status   04/19/2023 37 (L) >=40 mg/dL Final   02/11/2021 30 (L) >=40 mg/dL Final   01/23/2019 36 (L) >=40 mg/dL Final     LDL Cholesterol Calculated   Date Value Ref Range Status   04/19/2023 102 (H) <=100 mg/dL Final   02/11/2021 65 <=129 mg/dL Final   01/23/2019 85 <=129 mg/dL Final     Triglycerides   Date Value Ref Range Status   04/19/2023 120 <150 mg/dL Final   02/11/2021 226 (H) <=149 mg/dL Final   01/23/2019 161 (H) <=149 mg/dL Final     No results found for: CHOLHDLRATIO    Most recent CBC:  Recent Labs   Lab Test 02/11/21  1212   WBC 6.8   HGB 17.0   HCT 47.6        Most recent hepatic panel:  Recent Labs   Lab Test 04/19/23  1025 02/11/21  1212   ALT 55 40   AST 36 28     Most recent creatinine:  Recent Labs   Lab Test 04/19/23  1025 02/11/21  1212   CR 0.88 1.01       No components found for: GFRESETIMATEDLASTLAB(gfrestblack:1@  Lab Results   Component Value Date    ALBUMIN 4.3 04/19/2023       Last Thyroid Profile:   TSH   Date Value Ref Range Status   04/19/2023 0.64 0.40 - 4.00 mU/L Final       Last Mineral Profile:   No results found for: ZA, IRON,  "FEB    Autoimmune & Inflammatory   CRP   Date Value Ref Range Status   02/11/2021 0.2 0.0 - 0.8 mg/dL Final         Last Vitamin Profile:   No results found for: VQT741, OMVJ110, [id number], VITD3, D2VIT, D3VIT, DTOT, [id number], [id number], [id number], [id number], [id number], [id number]    ANTHROPOMETRICS:  Vitals:   BP Readings from Last 1 Encounters:   04/19/23 130/88     Pulse Readings from Last 1 Encounters:   04/19/23 94     Estimated body mass index is 36.94 kg/m  as calculated from the following:    Height as of 4/19/23: 1.918 m (6' 3.5\").    Weight as of 4/19/23: 135.9 kg (299 lb 8 oz).    Wt Readings from Last 5 Encounters:   04/19/23 135.9 kg (299 lb 8 oz)   09/22/22 136.8 kg (301 lb 8 oz)   02/11/21 93.5 kg (206 lb 3.2 oz)   01/07/20 132.5 kg (292 lb)   04/17/19 131.2 kg (289 lb 5 oz)     NUTRITION DIAGNOSIS:   1.Obesity related to food and nutrition related knowledge deficit (excessive CHO intake, Inadequate fiber intake, inappropriate intake of healthy omega 3 fatty acids) as evidenced by nutrition intake record, inadequate sleep (working night shift) BMI 36, elevated Total chol, elevated , and low HDL 37.       NUTRITION INTERVENTION:   Nutrition Education (Application):  Cardiometabolic Food Plan Materials by [employer] [place] [employer]     Discussed healthy eating patters, portion sizes, whole grains, fats, protein & fiber    Reviewed labels focusing on carb, sugar and fiber servings    Reviewed and planned out some recipes for breakfast and snacks     Reviewed healthy sleep patterns and times of eating on night shift       Long Term Goals:   Goal: Lipid profile; Increase HDL > 49mg/dL, Decrease LDL <100mg/dL within 6 months   Goal: Lose 20-24lbs in 6 months, recommend average 1-2lbs per week of weight loss.         Short Term Goals:  Goal 1: Focus on having a healthy balanced breakfast daily for the next nine weeks when you get home before bed around 8-9am.   Incorporate a whole " grain at breakfast ex oatmeal 1 serving 15g of carbs naturally gluten free with more healthy fats such as serving of walnuts and 1 fruit serving 15g of carbs such as blueberries. Try 2 servings (12g) of lean protein on side such as 2 eggs, 2 turkey or chicken sausages.    Try a smoothie 1-2x a week for breakfast for the next six weeks see recipe discussed. add in unsweetened flax, hemp or coconut milk, 1 tbsp of healthy fat such as flax seed ground or lexis seed ground. serving of fruit 1 cup of berries. Veggie (optional), 1 scoop of plant based protein powder or collagen powder, 1 serving of fruit and veggie such as kale, spinach, or veggie powder even spirulina      Goal 2:  Try to have 1 snack daily between meals that includes 1 fruit serving 15g of carbs and healthy fat and or lean protein for the next nine weeks.   - see meal plan and recipe ideas in the cardi metabolic guides provided.       2 hard boiled eggs.     Greek Yogurt with Blackberries    Fresh Yellow Pear or veggie (radishes, bell peppers, baby cucumbers, carrots or sugar snap peas) with Hummus - try the mini to go packs to help with portion control     Marinated Olives* and Kefir     Purple Plum with Mixed Nuts    Celery with [place] Butter    Dark Chocolate, 70% or higher Cocoa and Pistachio Nuts    Banana with almond butter or sunflower seed butter     Apple with peanut butter topped with cinnamon     Baby cucumber or carrots, bell pepper, radishes or sugar snap peas with individual servings of guac     Low sodium cottage with peaches or mandarin oranges     Sunflower seeds with strawberries     Turkey jerky or 100% grass fed beef jerky     Plant based protein bar     Simple Mills [place] Flour Crackers or Yareli Gone Cracker dip with hummus or guac     Avocado on slice of gluten free bread recommend base culture brand or 100% whole grain bread     Goal 3: Monitor portion sizes - Focus on 70g of protein per day. 21g at meals and  - measure out food  portions for the next six weeks to gain a better understanding of what a serving size actually is.  Specifically your rice portions. Swap for quinoa or cauliflower rice to help with the transition and follow more gluten free foods - review handout for portions   - Consider just having 2 serving of grains per day (30g) and swap for lower carb/sugar options such as non-starchy veggies.   - Read labels for low sugar and carbs.       Goal 4: Recommend the following supplements to start     One Multivitamin by Pure encapsulations - 1 capsule per day with food when you go to start your work shift     Nordic Naturals Plus vitamin D - 1-2 capsules daily with food ----https://www.[employer]/consumers/ultimate-omega-d3     Look on amazon to find the supplement recommendations or talk with the [facility] to special order. They may be able to offer a discount as well.     Goal 5: Have a healthy balanced dinner before going off to your night shift at around 7pm for the next nine weeks. Have a healthy balanced lunch at work 11-12am.     Goal 6: Get at least 6-8 hours of sleep per day to help regulate circadian rhythm.     Mediterranean Approach: Eat whole, unprocessed real foods in their unprocessed forms such as fruits, vegetables, whole grains (prefer gluten free), nuts, legumes, extra virgin olive oil, spices, modest amounts of poultry, and fish.      Avoid inflammatory foods: Eliminate gluten found in wheat, barley, rye, oats, kamut, and spelt for at least 3 weeks to identify any hidden reaction. Gluten sensitivity or allergy can cause many different types of symptoms form migraines to fatigue to weight gain.  If symptoms go away this is a clue you may be reactive to gluten.  Dairy can also be inflammatory consider eliminating for 3 weeks as well. The proteins like casein and whey in dairy can irritate and inflame your gut. Also the sugar lactase in dairy can cause digestive issues in addition to  blood sugar spikes.     Cardiometabolic Food plan - 1,800-2,200 calories per day     Protein 10-12 servings per day - include at each meal to stabilize blood sugars   (Choose 3oz or 21g per meal and aim for 1oz of 7g for snacks)   - Strive for 1-2 servings of fish per week especially of higher omega-3 fatty acid containing fish such as salmon.     Legumes 2 serving per day     Dairy alternatives 2-3 serving per day     Nuts and seeds 3-4 servings per day - great to incorporate as snacks    Fats and oils 4 servings per day     Non starchy vegetables 8-10 servings per day     Starchy vegetable limit 1 serving per day as they tend to impact blood sugar (they are moderate-GI).     Fruits 2 servings per day - best to couple with a little bit of protein or fat to offset a rise in blood sugars (they are low-moderate-GI foods).     Whole grains 2 serving per day - try gluten free whole grains instead        Incorporate protein powder daily:    Plant based hemp (recommended brands: Project Dance, [employer], Just Hemp Protein, [employer])      Plant based pea (recommended brands: Naked Pea, Now Sports). If you want to try a combo of pea and hemp the brand Barraza in vanilla or chocolate is a great option.     Try Bone broth protein powder or collagen peptides in liquid bone broth, vegetable broth or 12 oz of water as snack. The bone broth powder and collagen can be used for soups as well. This can help provide essential amino acids and minerals that heal your gut as well as balance blood sugars. A great option if you have a hard time tolerating solids.     Can also consider pre made shakes if unable to make smoothies.      Brand examples that are gluten and dairy free to try:   1) Orgain Vegan Protein Shakes, 20g of Plant Based Protein, Creamy Chocolate - Gluten Free, No Dairy, Soy, or Preservatives, No Added Sugar  2) Pirq, Vegan Protein Shake, Turmeric Curcumin, Lia, Plant-Based Protein Drink, Gluten-Free, Dairy-Free,  Soy-Free, Non-GMO, Vegetarian, Kosher, Keto, Low Carb, Low Calorie  3) OWYN Pro Elite Vegan Plant-Based High Protein Shake, 35g Protein, 9 Amino Acids, Omega-3, Prebiotics, Superfoods Greens for Workout and Recovery, 0g Net Carbs, Zero Sugar, Keto  4) Ripple Vegan Protein Shake  Chocolate  20g Nutritious Plant Based Pea Protein  Shelf Stable  No GMOs, Soy, Nut, Gluten, Lactose  5) [employer] Glucose Support 1.2 Plant based, dairy free, low glycemic index  https://trend.ly.[employer]/products/glucose-support-1-2-vanilla    Choose Low Glycemic (GI) foods: Regulate your sugar levels by eating foods that do not spike blood sugars.  Eat low -GI foods so only small fluctuations in blood glucose and insulin levels are produced.      Examples of low-GI foods: nuts, seeds, GF oats, most vegetables especially non-starchy and fruits.     Medium or high-GI foods should be eaten with a protein or fat, both of which blunt the glycemic effect of these foods. This reduces the overall glycemic impact of a meal.   Ex: Most grains and starchy veggies are medium/high GI.     Avoid foods containing refined sugars, artificial sweeteners, and refined grains they are considered high-GI because they lead to sharp increases in blood sugars levels, which increase insulin sensitivity causing increased TG, and low good cholesterol (HDL).   Ex: cakes, cookies, pies, bread, sodas, fruit drinks, presweetened tea, coffee drinks, energy or sport drinks, flavored milk and other processed foods.     Choose foods high in fiber: Aim for at least 5 grams of fiber per serving of food or a total of 25-35 grams fiber per day. Remember, when looking at the label, you can take the fiber away from the total carbs. Ex:15g of total carbs - 4g of fiber = 11g net carbs     Insoluble fiber acts like a bulky  inner broom,  sweeping out debris from the intestine and creating more motility and movement.      Soluble fiber attracts water and swells, creating a gel  that slows digestion.  Also, slows the release of glucose from foods into the blood which stops spikes in blood sugar levels.  Soluble fiber traps toxins in the gut, helping to carry them to excretion and provides healthy bacteria in the digestive tract.     Choose High Quality Fats: Adding anti-inflammatory fats into your diet such as fish (salmon, herring, mackerel, and sardines), omega 3 eggs, lexis seeds, ground flax seeds/milk, hemp seeds/milk, walnuts and some other certain leafy greens will increase omega-3 fats to omeaga-6 fats ratio.     Therapeutic fats both monounsaturated and polyunsaturated to include daily: ground flaxseeds, unsalted mixed nuts, avocados, olives, extra-virgin olive oil.     Emphasize high-quality oils and fats in the diet daily such as avocado oil, coconut oil, flaxseed, olive, sesame. Ex: 1 tsp to 1 tbsp of MCT oil from coconuts can be added into coffee, smoothies, and salad dressings per day.     Avoid trans fats found in processed foods     NUTRITION RESOURCES:  1. IFM Cardiometabolic Packet     Functional Nutrition Fundamentals     Comprehensive Guide    Meal plan with recipes   2. Suggestions for better sleep handout   3. Sleep Smarter Book by Pratik Ambriz      PATIENT'S BEHAVIOR CHANGE GOALS:   See nutrition intervention for patient stated behavior change goals.     MONITOR / EVALUATE:  Registered Dietitian will monitor/evaluate the following:     Beliefs and attitudes related to food    Food and nutrition knowledge / skills    Food / Beverage / Nutrient intake     Pertinent Labs    Progress toward meeting stated nutrition-related goals    Readiness to change nutrition-related behaviors    Weight change    Digestion     COORDINATION OF CARE:  Follow up with referring provider as needed       FOLLOW-UP:  Follow up scheduled for June 21st at 11am     Time spent in minutes: 60 minutes 4 units   Encounter: Individual    Ave Johnson, RD, CLT, LD  Integrative Registered Dietitian

## 2023-04-21 NOTE — PATIENT INSTRUCTIONS
NUTRITION INTERVENTION:   Nutrition Education (Application):  Cardiometabolic Food Plan Materials by [employer]     Discussed healthy eating patters, portion sizes, whole grains, fats, protein & fiber    Reviewed labels focusing on carb, sugar and fiber servings    Reviewed and planned out some recipes for breakfast and snacks     Reviewed healthy sleep patterns and times of eating       Long Term Goals:   Goal: Lipid profile; Increase HDL > 49mg/dL, Decrease LDL <100mg/dL within 6 months   Goal: Lose 20-24lbs in 6 months, recommend average 1-2lbs per week of weight loss.         Short Term Goals:  Goal 1: Focus on having a healthy balanced breakfast daily for the next nine weeks when you get home before bed around 8-9am.   Incorporate a whole grain at breakfast ex oatmeal 1 serving 15g of carbs naturally gluten free with more healthy fats such as serving of walnuts and 1 fruit serving 15g of carbs such as blueberries. Try 2 servings (12g) of lean protein on side such as 2 eggs, 2 turkey or chicken sausages.    Try a smoothie 1-2x a week for breakfast for the next six weeks see recipe discussed. add in unsweetened flax, hemp or coconut milk, 1 tbsp of healthy fat such as flax seed ground or lexis seed ground. serving of fruit 1 cup of berries. Veggie (optional), 1 scoop of plant based protein powder or collagen powder, 1 serving of fruit and veggie such as kale, spinach, or veggie powder even spirulina      Goal 2:  Try to have 1 snack daily between meals that includes 1 fruit serving 15g of carbs and healthy fat and or lean protein for the next nine weeks.   - see meal plan and recipe ideas in the cardi metabolic guides provided.     2 hard boiled eggs.   Greek Yogurt with Blackberries  Fresh Yellow Pear or veggie (radishes, bell peppers, baby cucumbers, carrots or sugar snap peas) with Hummus - try the mini to go packs to help with portion control   Marinated Olives* and Kefir   Purple  Plum with Mixed Nuts  Celery with [place] Butter  Dark Chocolate, 70% or higher Cocoa and Pistachio Nuts  Banana with almond butter or sunflower seed butter   Apple with peanut butter topped with cinnamon   Baby cucumber or carrots, bell pepper, radishes or sugar snap peas with individual servings of guac   Low sodium cottage with peaches or mandarin oranges   Sunflower seeds with strawberries   Turkey jerky or 100% grass fed beef jerky   Plant based protein bar   Simple Mills [place] Flour Crackers or Yareli Gone Cracker dip with hummus or guac   Avocado on slice of gluten free bread recommend base culture brand or 100% whole grain bread     Goal 3: Monitor portion sizes - Focus on 70g of protein per day. 21g at meals and  - measure out food portions for the next six weeks to gain a better understanding of what a serving size actually is.  Specifically your rice portions. Swap for quinoa or cauliflower rice to help with the transition and follow more gluten free foods - review handout for portions   - Consider just having 2 serving of grains per day (30g) and swap for lower carb/sugar options such as non-starchy veggies.   - Read labels for low sugar and carbs.       Goal 4: Recommend the following supplements to start   One Multivitamin by Pure encapsulations - 1 capsule per day with food when you go to start your work shift   Nordic Naturals Plus vitamin D - 1-2 capsules daily with food ----[email or web address]     Look on amazon to find the supplement recommendations or talk with the [facility] to special order. They may be able to offer a discount as well.     Goal 5: Have a healthy balanced dinner before going off to your night shift at around 7pm for the next nine weeks. Have a healthy balanced lunch at work 11-12am.     Goal 6: Get at least 6-8 hours of sleep per day to help regulate circadian rhythm.     Mediterranean Approach: Eat whole, unprocessed  real foods in their unprocessed forms such as fruits, vegetables, whole grains (prefer gluten free), nuts, legumes, extra virgin olive oil, spices, modest amounts of poultry, and fish.      Avoid inflammatory foods: Eliminate gluten found in wheat, barley, rye, oats, kamut, and spelt for at least 3 weeks to identify any hidden reaction. Gluten sensitivity or allergy can cause many different types of symptoms form migraines to fatigue to weight gain.  If symptoms go away this is a clue you may be reactive to gluten.  Dairy can also be inflammatory consider eliminating for 3 weeks as well. The proteins like casein and whey in dairy can irritate and inflame your gut. Also the sugar lactase in dairy can cause digestive issues in addition to blood sugar spikes.     Cardiometabolic Food plan - 1,800-2,200 calories per day   Protein 10-12 servings per day - include at each meal to stabilize blood sugars   (Choose 3oz or 21g per meal and aim for 1oz of 7g for snacks)   Strive for 1-2 servings of fish per week especially of higher omega-3 fatty acid containing fish such as salmon.   Legumes 2 serving per day   Dairy alternatives 2-3 serving per day   Nuts and seeds 3-4 servings per day - great to incorporate as snacks  Fats and oils 4 servings per day   Non starchy vegetables 8-10 servings per day   Starchy vegetable limit 1 serving per day as they tend to impact blood sugar (they are moderate-GI).   Fruits 2 servings per day - best to couple with a little bit of protein or fat to offset a rise in blood sugars (they are low-moderate-GI foods).   Whole grains 2 serving per day - try gluten free whole grains instead        Incorporate protein powder daily:  Plant based hemp (recommended brands: [place] Ethel, Nutiva, Just Hemp Protein, [employer] Red Mill)    Plant based pea (recommended brands: Naked Pea, Now Sports). If you want to try a combo of pea and hemp the brand Barraza in vanilla or chocolate is a great option.   Try Bone  broth protein powder or collagen peptides in liquid bone broth, vegetable broth or 12 oz of water as snack. The bone broth powder and collagen can be used for soups as well. This can help provide essential amino acids and minerals that heal your gut as well as balance blood sugars. A great option if you have a hard time tolerating solids.     Can also consider pre made shakes if unable to make smoothies.      Brand examples that are gluten and dairy free to try:   1) Orgain Vegan Protein Shakes, 20g of Plant Based Protein, Creamy Chocolate - Gluten Free, No Dairy, Soy, or Preservatives, No Added Sugar  2) Pirq, Vegan Protein Shake, Turmeric Curcumin, Lia, Plant-Based Protein Drink, Gluten-Free, Dairy-Free, Soy-Free, Non-GMO, Vegetarian, Kosher, Keto, Low Carb, Low Calorie  3) OWYN Pro Elite Vegan Plant-Based High Protein Shake, 35g Protein, 9 Amino Acids, Omega-3, Prebiotics, Superfoods Greens for Workout and Recovery, 0g Net Carbs, Zero Sugar, Keto  4) Ripple Vegan Protein Shake  Chocolate  20g Nutritious Plant Based Pea Protein  Shelf Stable  No GMOs, Soy, Nut, Gluten, Lactose  5) Meituan.com Glucose Support 1.2 Plant based, dairy free, low glycemic index  https://[email or web address]/products/glucose-[email or web address]-vanilla    Choose Low Glycemic (GI) foods: Regulate your sugar levels by eating foods that do not spike blood sugars.  Eat low -GI foods so only small fluctuations in blood glucose and insulin levels are produced.      Examples of low-GI foods: nuts, seeds, GF oats, most vegetables especially non-starchy and fruits.     Medium or high-GI foods should be eaten with a protein or fat, both of which blunt the glycemic effect of these foods. This reduces the overall glycemic impact of a meal.   Ex: Most grains and starchy veggies are medium/high GI.     Avoid foods containing refined sugars, artificial sweeteners, and refined grains they are considered high-GI because they lead to sharp increases in blood  sugars levels, which increase insulin sensitivity causing increased TG, and low good cholesterol (HDL).   Ex: cakes, cookies, pies, bread, sodas, fruit drinks, presweetened tea, coffee drinks, energy or sport drinks, flavored milk and other processed foods.     Choose foods high in fiber: Aim for at least 5 grams of fiber per serving of food or a total of 25-35 grams fiber per day. Remember, when looking at the label, you can take the fiber away from the total carbs. Ex:15g of total carbs - 4g of fiber = 11g net carbs     Insoluble fiber acts like a bulky  inner broom,  sweeping out debris from the intestine and creating more motility and movement.      Soluble fiber attracts water and swells, creating a gel that slows digestion.  Also, slows the release of glucose from foods into the blood which stops spikes in blood sugar levels.  Soluble fiber traps toxins in the gut, helping to carry them to excretion and provides healthy bacteria in the digestive tract.     Choose High Quality Fats: Adding anti-inflammatory fats into your diet such as fish (salmon, herring, mackerel, and sardines), omega 3 eggs, lexis seeds, ground flax seeds/milk, hemp seeds/milk, walnuts and some other certain leafy greens will increase omega-3 fats to omeaga-6 fats ratio.     Therapeutic fats both monounsaturated and polyunsaturated to include daily: ground flaxseeds, unsalted mixed nuts, avocados, olives, extra-virgin olive oil.     Emphasize high-quality oils and fats in the diet daily such as avocado oil, coconut oil, flaxseed, olive, sesame. Ex: 1 tsp to 1 tbsp of MCT oil from coconuts can be added into coffee, smoothies, and salad dressings per day.   Avoid trans fats found in processed foods     NUTRITION RESOURCES:  IFM Cardiometabolic Packet   Functional Nutrition Fundamentals   Comprehensive Guide  Meal plan with recipes   Suggestions for better sleep handout   Sleep Smarter Book by Pratik Ambriz

## 2023-04-24 ENCOUNTER — TELEPHONE (OUTPATIENT)
Dept: NUTRITION | Facility: CLINIC | Age: 38
End: 2023-04-24
Payer: COMMERCIAL

## 2023-04-24 NOTE — TELEPHONE ENCOUNTER
Left Voicemail (1st Attempt) for the patient to call back and schedule the following:    Appointment type: return video visit   Provider: coby barber   Return date: 6/21/2023  Specialty phone number: [phone]   Additonal Notes: Return in about 2 months (around 6/21/2023    Micki ch Procedure   Orthopedics, Podiatry, Sports Medicine, Ent ,Eye , Audiology, Adult Endocrine & Diabetes, Nutrition & Medication Therapy Management Specialties   [facility]

## 2023-04-28 NOTE — TELEPHONE ENCOUNTER
Left Voicemail (2nd Attempt) for the patient to call back and schedule the following:    Appointment type: return video visit   Provider: coby barber   Return date: 6/21/2023  Specialty phone number: [phone]   Additonal Notes: Return in about 2 months (around 6/21/2023    Micki ch Procedure   Orthopedics, Podiatry, Sports Medicine, Ent ,Eye , Audiology, Adult Endocrine & Diabetes, Nutrition & Medication Therapy Management Specialties   [facility]

## 2023-05-26 ENCOUNTER — VIRTUAL VISIT (OUTPATIENT)
Dept: NUTRITION | Facility: CLINIC | Age: 38
End: 2023-05-26
Payer: COMMERCIAL

## 2023-05-26 DIAGNOSIS — E66.812 CLASS 2 OBESITY DUE TO EXCESS CALORIES WITHOUT SERIOUS COMORBIDITY WITH BODY MASS INDEX (BMI) OF 36.0 TO 36.9 IN ADULT: Primary | ICD-10-CM

## 2023-05-26 DIAGNOSIS — E66.09 CLASS 2 OBESITY DUE TO EXCESS CALORIES WITHOUT SERIOUS COMORBIDITY WITH BODY MASS INDEX (BMI) OF 36.0 TO 36.9 IN ADULT: Primary | ICD-10-CM

## 2023-05-26 PROCEDURE — 97803 MED NUTRITION INDIV SUBSEQ: CPT | Mod: VID | Performed by: DIETITIAN, REGISTERED

## 2023-05-26 NOTE — PROGRESS NOTES
Medical Nutrition Therapy  Visit Type: Reassessment and intervention    Visit Details    How would you like to obtain your AVS? mychart   If the correspondence for visit is dropped, how would you like your dietitian to reconnect with you:   call back by phone? Yes   Will anyone else be joining your video visit or telephone call? no5    Type of service:  Video Visit    Start Time: 10:25am    End Time: 11:00am     Originating Location (pt. Location): home    Distant Location (provider location):  [facility] virtual off-site    Platform used for Video Visit: adriana       Referring Provider: Eddy Butt  [facility]     REASON FOR REFERRAL:   Kiran Arroyo is a 37 year old male who is interested in Medical Nutrition Therapy (MNT) and education related to weight management.     Changes since previous consult Yes        Behavior Status:Improvement shown  Barriers Include:Consistency, time, night shift worker    Goals: What would you like to work on that will help you most over the next several weeks? Meal planning and staying consistent with the goals previously set around hydration, protein, sleep and exercise      Things have been going well since our initial consult. He found the resources easy to follow but hard sometimes to commit with family schedule. Having more nuts and seeds with snacks have helped. He also has been doing the smoothies which have helped keep from being so hungry. He has worked on portion sizes and has a lot a total of 7lbs. He has had less joint pain. He has been having something to eat as discussed for breakfast at 8-9am. This has helped with his sleep patterns. He has been working on less broken sleep patterns. He took a week off of work and slept from 9pm-7am for a week and that helped with his circadian limit n ow sleeping 6 hours. Usually if  Has a heavy meal before gets stomach ache and wakes up but since having hte smoothies, extra  protein and fiber he doesn't have the cravings and wanting to eat anything in the fridge. He feels he is trying to fuel his body now instead of just eating to try to stay awake and please cravings. He did get a multivitamin supplement, fish oil and metamucil. If he ends up skipping a meal or in between meals has been doing a high protein shake. He ordered and is waiting for the sleep smarter book to come in. He wants to continue to focus on weight loss and body composition. He was up to 24-25% body fat and now down to 22% body fat. He has only changed his dietary and sleep patterns not working out. He wants to focus on walking more every day before work between 3-3:45pm walk dog. Wife has been helping to make meals with more protein and veggies. He has been consistent with meals before work. He wants to keep in mind more water in between meals. He has been trying to drink 2-3 bottles of 16oz water. Also been getting ride of sugar. He wants to be more consistent some days is better than others. He wants to improve what he is already working on before moving onto the next goal. He doesn't want it to be harder for him adding in more goals. He wants to focus on other ways to sub healthy foods into his diet. He just wants to revisit and make better discipline with habits.    Notes 4/21/23  INITIAL NUTRITION ASSESSMENT:   Would like a better understanding of how his diet can benefit him. He works nights and tries to function the best during the day for his family. He is eating on the go constantly. Trying to function with a normal life with night shitf. He works 10 hour days. The day before his work scheudle. Eats at 7am - rice, beans, chicken, hotdog, spaghetti (any dinner leftovers from day before)  and tries to take power nap before going in. He eats lunch at work 12pm-2am sometimes skips and just goes till eating at home. 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 He will than sleep about 4-5  hours and snack at work. Peanut butter sandwich, leftovers, poptart. When at home normally eats a sandwich or goes out to eat.He doesn't know if he is messing up his metabolism with this schedule. Should he still eat normally sometimes sleeps right after eating.   He is accompanied by self          4/21/2023     8:17 AM   Neurological   Migraine Headaches Past              4/21/2023     8:17 AM   Gastrointestinal   Diarrhea Past   Gas/bloating Past   Abdominal Pain Past           4/21/2023     8:17 AM   Food Sensitivities   Lactose intolerance Current          4/21/2023     8:17 AM   Endocrine   Overweight/obesity Current          4/21/2023     8:17 AM   Skin   Acne Current   Hives Current          4/21/2023     8:17 AM   Cardiopulmonary   High Cholesterol Current        Past Medical History:  Past Medical History:   Diagnosis Date     Obesity 9/9/2016       Previous Surgeries:   Past Surgical History:   Procedure Laterality Date     WISDOM TOOTH EXTRACTION          Family History:  Family History   Problem Relation Age of Onset     No Known Problems Mother      Diabetes Maternal Grandmother      Breast Cancer Maternal Grandmother      Cancer Paternal Grandmother      Colon Cancer Paternal Grandfather         Lifestyle History:      4/21/2023     8:17 AM   Lifestyle   Do you feel your life is stressful right now?  Yes   What is the cause(s) of stress in your life?  Work;Health Concerns;Multi-tasking and multiple deadlines to meet;Over thinking and too much analysis   Are you currently implementing any strategies to help manage stress? Yes   What are you doing to manage stress?  TV;Take time for self        Exercise History:      4/21/2023     8:17 AM   Exercise   Does your occupation require extended periods of sitting?  Yes   Does your occupation require extended periods of repetitive movements (ex: walking or lifting)?  Yes   Do you currently participate in any forms of exercise? Yes   Check all the exercises you  participate in: Walking;Weight Lifting;Other Cardio   How many times per week do you exercise? 2 times   How long do you usually exercise? 30 min        Sleep History:      4/21/2023     8:17 AM   Sleep   How many hours (on average) do you sleep per night? 4-6   What time do you turn off the lights? 10 PM   How long does it take for you to fall asleep? 30-45 mins   What time do you stop using electronic devices? 10 PM   What time do you wake up? 3 PM   When do you eat your first meal?  7 PM   Do you feel well-rested during the day?  No   Do you take naps?  Yes   Do you have a comfortable bedroom environment (cool, quiet, dark, etc)? Yes   Do you have a sleep routine/ ritual that you do before bed?  Yes   How many hours do you spend per day looking at a screen (TV, computer, tablet and phone)? 0 to 2   Select all factors that apply to your current sleep habits: Difficulty falling asleep;Wake up in the middle of the night;Sleep eating;Snore loudly or have been told you stop breathing;Eat large meals within 3 hours of going to bed;Night sweats;No control over nighttime eating        Nutrition History:      4/21/2023     8:17 AM   Nutrition   Have you ever had a nutrition consultation? Yes   Do you currently follow a special diet or nutritional program? No   What do you feel are the biggest barriers getting in the way of achieving you nutritional goals? Motivation/Readiness to change;Lack of prep/cooking skills;Work (such as lack of time to eat, unhealthy choices, etc.);Lack of control over emotions/behaviors   Do you have any food allergies, sensitivities or intolerances?  No           4/21/2023     8:17 AM   Digestion   Do you experience stomach pains/cramping? Monthly   Do you experience bloating?  Monthly   Do you experience gas?  2-3 times per week   Do you experience heartburn/acid reflux/indigestion? Weekly   How often do you have a bowel movement? Every other day   What is a typical bowel movement like for you?  Select all that apply: Varies a lot          4/21/2023     8:17 AM   Food Access:    Who does the grocery shopping? Spouse/Partner   How often is grocery shopping done? 2 times per week   Where do you usually receive your groceries from? Select all that apply: Walmart;Target;[employer];Costco;[employer]ee;Aldi's;Whole Foods   Do you read food labels? Yes   What do you look at?  Calories;Organic;Fats;Carbs;Fiber;Sugar   Who does the cooking? Select all that apply: Self;Spouse/Partner   How many meals do you eat out per week?  3 to 5   What restaurants do you typically choose? Bar/Grills          4/21/2023     8:17 AM   Daily Patterns:   How many days per week do you have breakfast? 4   How many days per week do you have lunch? 4   How many days per week do you have dinner? 5   How many days per week do you have snacks? 5          4/21/2023     8:17 AM   Protein Intake:   How many times per day do you typically consume a protein source(s)? 5 or more   What types of protein do you currently eat?  Ground Beef;Beef Ribs;Beef Roast;Beef Hot Dogs;Pork Chops;Pork Ribs;Diaz/[place];Sausage;Other Pork;[place];Tuna;Shrimp;Lobster;Other Fish;Ground Cervantes;Cervantes Chops;Chicken Breast;Ground Chicken;Chicken Sausage;Other Chicken;Turkey Breast;Turkey Thighs;Turkey Diaz           4/21/2023     8:17 AM   Fat Intake:    How many times per day do you typically consume healthy fat(s)? 5 or more   What types of health fats do you currently eat? Select all that apply:  Almonds;Walnuts;Other nuts           4/21/2023     8:17 AM   Fruit Intake:    How many times per day do you typically consume fruits? 3   What types of fruit do currently eat? Apple;Banana;Pears           4/21/2023     8:17 AM   Vegetable Intake:    How many times per day do you typically consume vegetables? 2   What types of vegetables do you currently eat? Asparagus;Beans;Broccoli;Celery;Greens (madiha, kale etc);Spinach;Squash, winter (acorn, butternut);Water chestnuts           4/21/2023     8:17 AM   Grain Intake:    How many times per day do you typically consume grains? 2   What types of grains do currently eat? Select all that apply:  Breads (gluten free);Brown rice (gluten free);Pasta (gluten free)           4/21/2023     8:17 AM   Dairy Intake:    How many times per day do you typically consume dairy? 4   What types of dairy do currently eat? Select all that apply:  Milk;Cheese;Yogurt;Ice cream           4/21/2023     8:17 AM   Non-Dairy Alternative Intake:    How many times per day do you typically consume non-dairy alternatives? 2   What types of non-dairy alternatives do currently eat? Select all that apply:  [place] milk;Pea protein milk;Cashew milk;Goat cheese;Other ice cream           4/21/2023     8:17 AM   Sweets Intake:    How many times per day do you typically consume sweets? 3          4/21/2023     8:17 AM   Beverage Intake:    How many 8 oz cups of water do you typically consume per day?  4 to 5   How many 8 oz cups of caffeine do you typically consume per day?  0   How many drinks of alcohol do you typically consume per week (1 drink = 5 oz wine, 12 oz beer, 1.5 oz spirits)?   0           4/21/2023     8:17 AM   Lifestyle Recall:    What time did you wake up? 9 AM   What time did you go to sleep? 9 AM   What time did you have breakfast? 7-8 AM   Where did you have breakfast?  Work   What time did you have a morning snack? Other   Where did you have your morning snack? Home   What time did you have lunch? Other   Where did you have lunch?  Work   What time did you have an afternoon snack? Other   Where did you have your afternoon snack? Work   What time did you have dinner? Other   Where did you have dinner?  Work   What time did you have an evening snack? 11 PM-12 AM   Where did you have your evening snack? Home   What time of day did you exercise? No Exercise          Additional concerns:   MEDICATIONS:  Current Outpatient Medications   Medication Sig Dispense  Refill     EPINEPHrine (ANY BX GENERIC EQUIV) 0.3 MG/0.3ML injection 2-pack Inject 0.3 mLs (0.3 mg) into the muscle as needed for anaphylaxis 2 each 1            View : No data to display.                  ALLERGIES:   No Known Allergies     .na  LABS:  Last Basic Metabolic Panel:  Lab Results   Component Value Date     04/19/2023     02/11/2021      Lab Results   Component Value Date    POTASSIUM 4.0 04/19/2023    POTASSIUM 4.2 02/11/2021     Lab Results   Component Value Date    CHLORIDE 105 04/19/2023    CHLORIDE 106 02/11/2021     Lab Results   Component Value Date    MARY 9.4 04/19/2023    MARY 9.8 02/11/2021     Lab Results   Component Value Date    CO2 26 04/19/2023    CO2 26 02/11/2021     Lab Results   Component Value Date    BUN 13 04/19/2023    BUN 20 02/11/2021     Lab Results   Component Value Date    CR 0.88 04/19/2023    CR 1.01 02/11/2021     Lab Results   Component Value Date    GLC 93 04/19/2023    GLC 83 02/11/2021    GLC 62 01/23/2019       Last Glucose Profile:   Hemoglobin A1C   Date Value Ref Range Status   01/23/2019 5.4 3.5 - 6.0 % Final       Last Lipid Profile:   Cholesterol   Date Value Ref Range Status   04/19/2023 163 <200 mg/dL Final   02/11/2021 140 <=199 mg/dL Final   01/23/2019 153 <=199 mg/dL Final     Direct Measure HDL   Date Value Ref Range Status   04/19/2023 37 (L) >=40 mg/dL Final   02/11/2021 30 (L) >=40 mg/dL Final   01/23/2019 36 (L) >=40 mg/dL Final     LDL Cholesterol Calculated   Date Value Ref Range Status   04/19/2023 102 (H) <=100 mg/dL Final   02/11/2021 65 <=129 mg/dL Final   01/23/2019 85 <=129 mg/dL Final     Triglycerides   Date Value Ref Range Status   04/19/2023 120 <150 mg/dL Final   02/11/2021 226 (H) <=149 mg/dL Final   01/23/2019 161 (H) <=149 mg/dL Final     No results found for: CHOLHDLRATIO    Most recent CBC:  Recent Labs   Lab Test 02/11/21  1212   WBC 6.8   HGB 17.0   HCT 47.6        Most recent hepatic panel:  Recent Labs   Lab Test  "04/19/23  1025 02/11/21  1212   ALT 55 40   AST 36 28     Most recent creatinine:  Recent Labs   Lab Test 04/19/23  1025 02/11/21  1212   CR 0.88 1.01       No components found for: GFRESETIMATEDLASTLAB(gfrestblack:1@  Lab Results   Component Value Date    ALBUMIN 4.3 04/19/2023       Last Thyroid Profile:   TSH   Date Value Ref Range Status   04/19/2023 0.64 0.40 - 4.00 mU/L Final       Last Mineral Profile:   No results found for: ZA, IRON, FEB    Autoimmune & Inflammatory   CRP   Date Value Ref Range Status   02/11/2021 0.2 0.0 - 0.8 mg/dL Final         Last Vitamin Profile:   No results found for: VGY402, JAOO220, [id number], VITD3, D2VIT, D3VIT, DTOT, [id number], [id number], [id number], [id number], [id number], [id number]    ANTHROPOMETRICS:  Vitals:   BP Readings from Last 1 Encounters:   04/19/23 130/88     Pulse Readings from Last 1 Encounters:   04/19/23 94     Estimated body mass index is 36.94 kg/m  as calculated from the following:    Height as of 4/19/23: 1.918 m (6' 3.5\").    Weight as of 4/19/23: 135.9 kg (299 lb 8 oz).    Wt Readings from Last 5 Encounters:   04/19/23 135.9 kg (299 lb 8 oz)   09/22/22 136.8 kg (301 lb 8 oz)   02/11/21 93.5 kg (206 lb 3.2 oz)   01/07/20 132.5 kg (292 lb)   04/17/19 131.2 kg (289 lb 5 oz)     NUTRITION DIAGNOSIS:   1.Obesity related to food and nutrition related knowledge deficit (excessive CHO intake, Inadequate fiber intake, inappropriate intake of healthy omega 3 fatty acids) as evidenced by nutrition intake record, inadequate sleep (working night shift) BMI 36, elevated Total chol, elevated , and low HDL 37.       NUTRITION INTERVENTION:   Nutrition Education (Application):  Cardiometabolic Food Plan Materials by [employer] [place] [employer]     Discussed healthy eating patters, portion sizes, whole grains, fats, protein & fiber    Reviewed labels focusing on carb, sugar and fiber servings    Reviewed and planned out some recipes for breakfast and " snacks     Reviewed healthy sleep patterns and times of eating on night shift       Long Term Goals:   Goal: Lipid profile; Increase HDL > 49mg/dL, Decrease LDL <100mg/dL within 6 months   Goal: Lose 20-24lbs in 6 months, recommend average 1-2lbs per week of weight loss.         Short Term Goals:  Goal 1: Improvement Shown: Focus on having a healthy balanced breakfast daily for the next nine weeks when you get home before bed around 8-9am.   Incorporate a whole grain at breakfast ex oatmeal 1 serving 15g of carbs naturally gluten free with more healthy fats such as serving of walnuts and 1 fruit serving 15g of carbs such as blueberries. Try 2 servings (12g) of lean protein on side such as 2 eggs, 2 turkey or chicken sausages.    Try a smoothie 1-2x a week for breakfast for the next six weeks see recipe discussed. add in unsweetened flax, hemp or coconut milk, 1 tbsp of healthy fat such as flax seed ground or lexis seed ground. serving of fruit 1 cup of berries. Veggie (optional), 1 scoop of plant based protein powder or collagen powder, 1 serving of fruit and veggie such as kale, spinach, or veggie powder even spirulina      Goal 2: Improvement Shown: Try to have 1 snack daily between meals that includes 1 fruit serving 15g of carbs and healthy fat and or lean protein for the next nine weeks.   - see meal plan and recipe ideas in the cardi metabolic guides provided.       2 hard boiled eggs.     Greek Yogurt with Blackberries    Fresh Yellow Pear or veggie (radishes, bell peppers, baby cucumbers, carrots or sugar snap peas) with Hummus - try the mini to go packs to help with portion control     Marinated Olives* and Kefir     Purple Plum with Mixed Nuts    Celery with Arjay Butter    Dark Chocolate, 70% or higher Cocoa and Pistachio Nuts    Banana with almond butter or sunflower seed butter     Apple with peanut butter topped with cinnamon     Baby cucumber or carrots, bell pepper, radishes or sugar snap peas with  individual servings of guac     Low sodium cottage with peaches or mandarin oranges     Sunflower seeds with strawberries     Turkey jerky or 100% grass fed beef jerky     Plant based protein bar     Simple Mills [place] Flour Crackers or Yareli Gone Cracker dip with hummus or guac     Avocado on slice of gluten free bread recommend base culture brand or 100% whole grain bread     Goal 3: Improvement Shown: Monitor portion sizes - Focus on 70g of protein per day. 21g at meals and  - measure out food portions for the next six weeks to gain a better understanding of what a serving size actually is.  Specifically your rice portions. Swap for quinoa or cauliflower rice to help with the transition and follow more gluten free foods - review handout for portions   - Consider just having 2 serving of grains per day (30g) and swap for lower carb/sugar options such as non-starchy veggies.   - Read labels for low sugar and carbs.       Goal 4: Improvement Shown: Recommend the following supplements to start     One Multivitamin by Pure encapsulations - 1 capsule per day with food when you go to start your work shift     Nordic Naturals Plus vitamin D - 1-2 capsules daily with food ----https://[email or web address].Elastagen/consumers/ultimate-omega-d3     Look on amazon to find the supplement recommendations or talk with the [facility] to special order. They may be able to offer a discount as well.     Goal 5: Improvement Shown: Have a healthy balanced dinner before going off to your night shift at around 7pm for the next nine weeks. Have a healthy balanced lunch at work 11-12am.     Goal 6: Improvement Shown: Get at least 6-8 hours of sleep per day to help regulate circadian rhythm.     Goal 7: Focus on adding in walking at least 2miles 5-7 days per week about 45 mins. Also try to cynthia in [id number] steps at work and at home. In future look at more jogging and sprinting.     Goal 8: Check out  https://Origen Therapeutics.co/collections/Origen Therapeutics/products/dailyMailPix-wellness-journal-a-holistic-guide-for-health-wellness-vitality    Mediterranean Approach: Eat whole, unprocessed real foods in their unprocessed forms such as fruits, vegetables, whole grains (prefer gluten free), nuts, legumes, extra virgin olive oil, spices, modest amounts of poultry, and fish.      Avoid inflammatory foods: Eliminate gluten found in wheat, barley, rye, oats, kamut, and spelt for at least 3 weeks to identify any hidden reaction. Gluten sensitivity or allergy can cause many different types of symptoms form migraines to fatigue to weight gain.  If symptoms go away this is a clue you may be reactive to gluten.  Dairy can also be inflammatory consider eliminating for 3 weeks as well. The proteins like casein and whey in dairy can irritate and inflame your gut. Also the sugar lactase in dairy can cause digestive issues in addition to blood sugar spikes.     Cardiometabolic Food plan - 1,800-2,200 calories per day     Protein 10-12 servings per day - include at each meal to stabilize blood sugars   (Choose 3oz or 21g per meal and aim for 1oz of 7g for snacks)   - Strive for 1-2 servings of fish per week especially of higher omega-3 fatty acid containing fish such as salmon.     Legumes 2 serving per day     Dairy alternatives 2-3 serving per day     Nuts and seeds 3-4 servings per day - great to incorporate as snacks    Fats and oils 4 servings per day     Non starchy vegetables 8-10 servings per day     Starchy vegetable limit 1 serving per day as they tend to impact blood sugar (they are moderate-GI).     Fruits 2 servings per day - best to couple with a little bit of protein or fat to offset a rise in blood sugars (they are low-moderate-GI foods).     Whole grains 2 serving per day - try gluten free whole grains instead      Incorporate protein powder daily:    Plant based hemp (recommended brands: [place] Pine, Nutiva,  Just Hemp Protein, Cayden's Red Mill)      Plant based pea (recommended brands: Naked Pea, Now Sports). If you want to try a combo of pea and hemp the brand Barraza in vanilla or chocolate is a great option.     Try Bone broth protein powder or collagen peptides in liquid bone broth, vegetable broth or 12 oz of water as snack. The bone broth powder and collagen can be used for soups as well. This can help provide essential amino acids and minerals that heal your gut as well as balance blood sugars. A great option if you have a hard time tolerating solids.     Can also consider pre made shakes if unable to make smoothies.      Brand examples that are gluten and dairy free to try:   1) Orgain Vegan Protein Shakes, 20g of Plant Based Protein, Creamy Chocolate - Gluten Free, No Dairy, Soy, or Preservatives, No Added Sugar  2) Pirq, Vegan Protein Shake, Turmeric Curcumin, Lia, Plant-Based Protein Drink, Gluten-Free, Dairy-Free, Soy-Free, Non-GMO, Vegetarian, Kosher, Keto, Low Carb, Low Calorie  3) OWYN Pro Elite Vegan Plant-Based High Protein Shake, 35g Protein, 9 Amino Acids, Omega-3, Prebiotics, Superfoods Greens for Workout and Recovery, 0g Net Carbs, Zero Sugar, Keto  4) Ripple Vegan Protein Shake  Chocolate  20g Nutritious Plant Based Pea Protein  Shelf Stable  No GMOs, Soy, Nut, Gluten, Lactose  5) Clinical Insight Glucose Support 1.2 Plant based, dairy free, low glycemic index  https://Spangle.E-Blink/products/glucose-support-1-2-vanilla    Choose Low Glycemic (GI) foods: Regulate your sugar levels by eating foods that do not spike blood sugars.  Eat low -GI foods so only small fluctuations in blood glucose and insulin levels are produced.      Examples of low-GI foods: nuts, seeds, GF oats, most vegetables especially non-starchy and fruits.     Medium or high-GI foods should be eaten with a protein or fat, both of which blunt the glycemic effect of these foods. This reduces the overall glycemic impact of a meal.    Ex: Most grains and starchy veggies are medium/high GI.     Avoid foods containing refined sugars, artificial sweeteners, and refined grains they are considered high-GI because they lead to sharp increases in blood sugars levels, which increase insulin sensitivity causing increased TG, and low good cholesterol (HDL).   Ex: cakes, cookies, pies, bread, sodas, fruit drinks, presweetened tea, coffee drinks, energy or sport drinks, flavored milk and other processed foods.     Choose foods high in fiber: Aim for at least 5 grams of fiber per serving of food or a total of 25-35 grams fiber per day. Remember, when looking at the label, you can take the fiber away from the total carbs. Ex:15g of total carbs - 4g of fiber = 11g net carbs     Insoluble fiber acts like a bulky  inner broom,  sweeping out debris from the intestine and creating more motility and movement.      Soluble fiber attracts water and swells, creating a gel that slows digestion.  Also, slows the release of glucose from foods into the blood which stops spikes in blood sugar levels.  Soluble fiber traps toxins in the gut, helping to carry them to excretion and provides healthy bacteria in the digestive tract.     Choose High Quality Fats: Adding anti-inflammatory fats into your diet such as fish (salmon, herring, mackerel, and sardines), omega 3 eggs, lexis seeds, ground flax seeds/milk, hemp seeds/milk, walnuts and some other certain leafy greens will increase omega-3 fats to omeaga-6 fats ratio.     Therapeutic fats both monounsaturated and polyunsaturated to include daily: ground flaxseeds, unsalted mixed nuts, avocados, olives, extra-virgin olive oil.     Emphasize high-quality oils and fats in the diet daily such as avocado oil, coconut oil, flaxseed, olive, sesame. Ex: 1 tsp to 1 tbsp of MCT oil from coconuts can be added into coffee, smoothies, and salad dressings per day.     Avoid trans fats found in processed foods     NUTRITION  RESOURCES:  1. IFM Cardiometabolic Packet     Functional Nutrition Fundamentals     Comprehensive Guide    Meal plan with recipes   2. Suggestions for better sleep handout   3. Sleep Smarter Book by Pratik Ambriz      PATIENT'S BEHAVIOR CHANGE GOALS:   See nutrition intervention for patient stated behavior change goals.     MONITOR / EVALUATE:  Registered Dietitian will monitor/evaluate the following:     Beliefs and attitudes related to food    Food and nutrition knowledge / skills    Food / Beverage / Nutrient intake     Pertinent Labs    Progress toward meeting stated nutrition-related goals    Readiness to change nutrition-related behaviors    Weight change    Digestion     COORDINATION OF CARE:  Follow up with referring provider as needed       FOLLOW-UP:  Follow up scheduled for July 25th at 11am     Time spent in minutes: 30 minutes 2 units   Encounter: Individual    Ave Johnson RD, CLT, LD  Integrative Registered Dietitian

## 2023-05-30 NOTE — PATIENT INSTRUCTIONS
NUTRITION INTERVENTION:   Nutrition Education (Application):  Cardiometabolic Food Plan Materials by [employer]     Discussed healthy eating patters, portion sizes, whole grains, fats, protein & fiber    Reviewed labels focusing on carb, sugar and fiber servings    Reviewed and planned out some recipes for breakfast and snacks     Reviewed healthy sleep patterns and times of eating on night shift       Long Term Goals:   Goal: Lipid profile; Increase HDL > 49mg/dL, Decrease LDL <100mg/dL within 6 months   Goal: Lose 20-24lbs in 6 months, recommend average 1-2lbs per week of weight loss.         Short Term Goals:  Goal 1: Improvement Shown: Focus on having a healthy balanced breakfast daily for the next nine weeks when you get home before bed around 8-9am.   Incorporate a whole grain at breakfast ex oatmeal 1 serving 15g of carbs naturally gluten free with more healthy fats such as serving of walnuts and 1 fruit serving 15g of carbs such as blueberries. Try 2 servings (12g) of lean protein on side such as 2 eggs, 2 turkey or chicken sausages.    Try a smoothie 1-2x a week for breakfast for the next six weeks see recipe discussed. add in unsweetened flax, hemp or coconut milk, 1 tbsp of healthy fat such as flax seed ground or lexis seed ground. serving of fruit 1 cup of berries. Veggie (optional), 1 scoop of plant based protein powder or collagen powder, 1 serving of fruit and veggie such as kale, spinach, or veggie powder even spirulina      Goal 2: Improvement Shown: Try to have 1 snack daily between meals that includes 1 fruit serving 15g of carbs and healthy fat and or lean protein for the next nine weeks.   - see meal plan and recipe ideas in the cardi metabolic guides provided.     2 hard boiled eggs.   Greek Yogurt with Blackberries  Fresh Yellow Pear or veggie (radishes, bell peppers, baby cucumbers, carrots or sugar snap peas) with Hummus - try the mini to go packs to help with  portion control   Marinated Olives* and Kefir   Purple Plum with Mixed Nuts  Celery with [place] Butter  Dark Chocolate, 70% or higher Cocoa and Pistachio Nuts  Banana with almond butter or sunflower seed butter   Apple with peanut butter topped with cinnamon   Baby cucumber or carrots, bell pepper, radishes or sugar snap peas with individual servings of guac   Low sodium cottage with peaches or mandarin oranges   Sunflower seeds with strawberries   Turkey jerky or 100% grass fed beef jerky   Plant based protein bar   Simple Mills [place] Flour Crackers or Yareli Gone Cracker dip with hummus or guac   Avocado on slice of gluten free bread recommend base culture brand or 100% whole grain bread     Goal 3: Improvement Shown: Monitor portion sizes - Focus on 70g of protein per day. 21g at meals and  - measure out food portions for the next six weeks to gain a better understanding of what a serving size actually is.  Specifically your rice portions. Swap for quinoa or cauliflower rice to help with the transition and follow more gluten free foods - review handout for portions   - Consider just having 2 serving of grains per day (30g) and swap for lower carb/sugar options such as non-starchy veggies.   - Read labels for low sugar and carbs.       Goal 4: Improvement Shown: Recommend the following supplements to start   One Multivitamin by Pure encapsulations - 1 capsule per day with food when you go to start your work shift   Nordic Naturals Plus vitamin D - 1-2 capsules daily with food ----https://www.Videoflow.com/consumers/ultimate-omega-d3     Look on amazon to find the supplement recommendations or talk with the [facility] to special order. They may be able to offer a discount as well.     Goal 5: Improvement Shown: Have a healthy balanced dinner before going off to your night shift at around 7pm for the next nine weeks. Have a healthy balanced lunch at work 11-12am.     Goal 6: Improvement  Shown: Get at least 6-8 hours of sleep per day to help regulate circadian rhythm.     Goal 7: Focus on adding in walking at least 2miles 5-7 days per week about 45 mins. Also try to cynthia in 4800-3015 steps at work and at home. In future look at more jogging and sprinting.     Goal 8: Check out https://Pathways Platform.co/collections/dailyIn[employer]/products/dailyLuna Innovationsea[employer])-wellness-journal-a-holistic-guide-for-health-wellness-vitality    Mediterranean Approach: Eat whole, unprocessed real foods in their unprocessed forms such as fruits, vegetables, whole grains (prefer gluten free), nuts, legumes, extra virgin olive oil, spices, modest amounts of poultry, and fish.      Avoid inflammatory foods: Eliminate gluten found in wheat, barley, rye, oats, kamut, and spelt for at least 3 weeks to identify any hidden reaction. Gluten sensitivity or allergy can cause many different types of symptoms form migraines to fatigue to weight gain.  If symptoms go away this is a clue you may be reactive to gluten.  Dairy can also be inflammatory consider eliminating for 3 weeks as well. The proteins like casein and whey in dairy can irritate and inflame your gut. Also the sugar lactase in dairy can cause digestive issues in addition to blood sugar spikes.     Cardiometabolic Food plan - 1,800-2,200 calories per day   Protein 10-12 servings per day - include at each meal to stabilize blood sugars   (Choose 3oz or 21g per meal and aim for 1oz of 7g for snacks)   Strive for 1-2 servings of fish per week especially of higher omega-3 fatty acid containing fish such as salmon.   Legumes 2 serving per day   Dairy alternatives 2-3 serving per day   Nuts and seeds 3-4 servings per day - great to incorporate as snacks  Fats and oils 4 servings per day   Non starchy vegetables 8-10 servings per day   Starchy vegetable limit 1 serving per day as they tend to impact blood sugar (they are moderate-GI).   Fruits 2 servings per day - best to couple with a  little bit of protein or fat to offset a rise in blood sugars (they are low-moderate-GI foods).   Whole grains 2 serving per day - try gluten free whole grains instead      Incorporate protein powder daily:  Plant based hemp (recommended brands: ManitoSpartoo [place], Nutiva, Just Hemp Protein, Discera Red Mill)    Plant based pea (recommended brands: Naked Pea, Now Sports). If you want to try a combo of pea and hemp the brand Barraza in vanilla or chocolate is a great option.   Try Bone broth protein powder or collagen peptides in liquid bone broth, vegetable broth or 12 oz of water as snack. The bone broth powder and collagen can be used for soups as well. This can help provide essential amino acids and minerals that heal your gut as well as balance blood sugars. A great option if you have a hard time tolerating solids.     Can also consider pre made shakes if unable to make smoothies.      Brand examples that are gluten and dairy free to try:   1) Orgain Vegan Protein Shakes, 20g of Plant Based Protein, Creamy Chocolate - Gluten Free, No Dairy, Soy, or Preservatives, No Added Sugar  2) Pirq, Vegan Protein Shake, Turmeric Curcumin, Lia, Plant-Based Protein Drink, Gluten-Free, Dairy-Free, Soy-Free, Non-GMO, Vegetarian, Kosher, Keto, Low Carb, Low Calorie  3) OWYN Pro Elite Vegan Plant-Based High Protein Shake, 35g Protein, 9 Amino Acids, Omega-3, Prebiotics, Superfoods Greens for Workout and Recovery, 0g Net Carbs, Zero Sugar, Keto  4) Ripple Vegan Protein Shake  Chocolate  20g Nutritious Plant Based Pea Protein  Shelf Stable  No GMOs, Soy, Nut, Gluten, Lactose  5) Ismole Glucose Support 1.2 Plant based, dairy free, low glycemic index  https://Safer Minicabs.Sparkbuy/products/glucose-support-1-2-vanilla    Choose Low Glycemic (GI) foods: Regulate your sugar levels by eating foods that do not spike blood sugars.  Eat low -GI foods so only small fluctuations in blood glucose and insulin levels are produced.      Examples  of low-GI foods: nuts, seeds, GF oats, most vegetables especially non-starchy and fruits.     Medium or high-GI foods should be eaten with a protein or fat, both of which blunt the glycemic effect of these foods. This reduces the overall glycemic impact of a meal.   Ex: Most grains and starchy veggies are medium/high GI.     Avoid foods containing refined sugars, artificial sweeteners, and refined grains they are considered high-GI because they lead to sharp increases in blood sugars levels, which increase insulin sensitivity causing increased TG, and low good cholesterol (HDL).   Ex: cakes, cookies, pies, bread, sodas, fruit drinks, presweetened tea, coffee drinks, energy or sport drinks, flavored milk and other processed foods.     Choose foods high in fiber: Aim for at least 5 grams of fiber per serving of food or a total of 25-35 grams fiber per day. Remember, when looking at the label, you can take the fiber away from the total carbs. Ex:15g of total carbs - 4g of fiber = 11g net carbs     Insoluble fiber acts like a bulky  inner broom,  sweeping out debris from the intestine and creating more motility and movement.      Soluble fiber attracts water and swells, creating a gel that slows digestion.  Also, slows the release of glucose from foods into the blood which stops spikes in blood sugar levels.  Soluble fiber traps toxins in the gut, helping to carry them to excretion and provides healthy bacteria in the digestive tract.     Choose High Quality Fats: Adding anti-inflammatory fats into your diet such as fish (salmon, herring, mackerel, and sardines), omega 3 eggs, lexis seeds, ground flax seeds/milk, hemp seeds/milk, walnuts and some other certain leafy greens will increase omega-3 fats to omeaga-6 fats ratio.     Therapeutic fats both monounsaturated and polyunsaturated to include daily: ground flaxseeds, unsalted mixed nuts, avocados, olives, extra-virgin olive oil.     Emphasize high-quality oils and fats  in the diet daily such as avocado oil, coconut oil, flaxseed, olive, sesame. Ex: 1 tsp to 1 tbsp of MCT oil from coconuts can be added into coffee, smoothies, and salad dressings per day.   Avoid trans fats found in processed foods     NUTRITION RESOURCES:  IFM Cardiometabolic Packet   Functional Nutrition Fundamentals   Comprehensive Guide  Meal plan with recipes   Suggestions for better sleep handout   Sleep Smarter Book by Pratik Ambriz

## 2023-07-20 ENCOUNTER — VIRTUAL VISIT (OUTPATIENT)
Dept: UROLOGY | Facility: CLINIC | Age: 38
End: 2023-07-20
Payer: COMMERCIAL

## 2023-07-20 DIAGNOSIS — Z30.09 VASECTOMY EVALUATION: ICD-10-CM

## 2023-07-20 PROCEDURE — 99202 OFFICE O/P NEW SF 15 MIN: CPT | Mod: VID | Performed by: UROLOGY

## 2023-07-20 NOTE — NURSING NOTE
Is the patient currently in the state of [place]? YES    Visit mode:VIDEO    If the visit is dropped, the patient can be reconnected by: VIDEO VISIT: Send to e-mail at: [email or web address]    Will anyone else be joining the visit? NO      How would you like to obtain your AVS? MyChart    Are changes needed to the allergy or medication list? YES: Pt. reports currently taking a course of abx, confirms doxycycline as seen on med recon list.     Reason for visit: Consult (Vasectomy)      Chelo Sanford on 7/20/2023 at 10:34 AM

## 2023-07-20 NOTE — PROGRESS NOTES
VASECTOMY CONSULTATION NOTE  DATE OF VISIT: 7/20/2023  LISA GIMENEZ   PATIENT NAME: Kiran Arroyo    YOB: 1985      REASON FOR CONSULTATION: Mr. Kiran Arroyo is a 38 year old year old gentleman who is seen today requesting a vasectomy. He has 4 children - 18, 14, 5, 3 year olds - and he wishes to have a vasectomy for birth control. His wife is in agreement with this plan.     PAST MEDICAL HISTORY:   Past Medical History:   Diagnosis Date     Obesity 9/9/2016       PAST SURGICAL HISTORY:   Past Surgical History:   Procedure Laterality Date     WISDOM TOOTH EXTRACTION         MEDICATIONS:   Current Outpatient Medications:      EPINEPHrine (ANY BX GENERIC EQUIV) 0.3 MG/0.3ML injection 2-pack, Inject 0.3 mLs (0.3 mg) into the muscle as needed for anaphylaxis, Disp: 2 each, Rfl: 1    ALLERGIES: No Known Allergies    FAMILY HISTORY:   Family History   Problem Relation Age of Onset     No Known Problems Mother      Diabetes Maternal Grandmother      Breast Cancer Maternal Grandmother      Cancer Paternal Grandmother      Colon Cancer Paternal Grandfather        SOCIAL HISTORY:   Social History     Socioeconomic History     Marital status:      Spouse name: Not on file     Number of children: Not on file     Years of education: Not on file     Highest education level: Not on file   Occupational History     Not on file   Tobacco Use     Smoking status: Never     Smokeless tobacco: Never   Vaping Use     Vaping Use: Never used   Substance and Sexual Activity     Alcohol use: No     Drug use: No     Sexual activity: Yes     Partners: Female   Other Topics Concern     Not on file   Social History Narrative     Not on file     Social Determinants of Health     Financial Resource Strain: Not on file   Food Insecurity: Not on file   Transportation Needs: Not on file   Physical Activity: Not on file   Stress: Not on file   Social Connections: Not on file   Intimate Partner Violence: Not on file   Housing  Stability: Not on file       PHYSICAL EXAM  Patient is a 38 year old  male   Vitals: There were no vitals taken for this visit.  There is no height or weight on file to calculate BMI.  General Appearance Adult:   Alert, no acute distress, oriented  HENT: throat/mouth:normal, good dentition  Lungs: no respiratory distress, or pursed lip breathing  Heart: No obvious jugular venous distension present  Abdomen: obesely - distended  Musculoskeltal: extremities normal, no peripheral edema  Skin: no suspicious lesions or rashes  Neuro: Alert, oriented, speech and mentation normal  Psych: affect and mood normal  Gait: Normal     DIAGNOSIS: Request for sterilization    PLAN: The risks of the procedure as well as expectations for recovery and outcomes were explained in detail to him.  He was counseled on the risks for bleeding infection and pain after the procedure. We discussed the risk of post-vasectomy pain syndrome.  He was instructed to continue to use contraception until he had proven azoospermia on a semen specimen.  This would normally be collected at least 3 months after the procedure. Also discussed the rare, but possible risk of re-canalization of the vas, even after successful vasectomy with sterile semen specimen.  He was instructed to hold all anticoagulants medications for one week prior to the procedure.  It was recommended that he have someone else drive him home after his vasectomy.  In light of these risks and expectations he would like to proceed.  We are scheduling a vasectomy in the office in the near future.    Pt. Understands:  -1/1000-1/3000 risk of future pregnancy even with perfectly done vasectomy  -vasectomy is a permanent procedure    -he may cryopreserve sperm if he wishes   -1-5% risk of post-vasectomy pain syndrome   -1-5% risk of complication, primarily infection or bleeding  - he needs to have a semen sample that shows no sperm before getting approval for unprotected intercourse.      Thank  you for the kind consultation.    Time spent: 15 minutes spent on the date of the encounter doing chart review, history and exam, documentation and further activities as noted above.     Terry House MD   Urology  [facility]  Clinic Phone [phone]        Virtual Visit Details    Type of service:  Video Visit     Start time: 10:40 AM     End time: 10:49 AM     Originating Location (pt. Location): Home  Distant Location (provider location):  On-site  Platform used for Video Visit: Relationship [employer]

## 2023-07-21 ENCOUNTER — TELEPHONE (OUTPATIENT)
Dept: UROLOGY | Facility: CLINIC | Age: 38
End: 2023-07-21
Payer: COMMERCIAL

## 2023-07-21 NOTE — TELEPHONE ENCOUNTER
Left Voicemail (1st Attempt) for the patient to call back and schedule the following:    Appointment type: Vasectomy  Provider: Dr. House  Return date: Next available  Specialty phone number: [phone]  Additonal Notes: Consult done 7/20/2023. Please schedule patient for Vasectomy.     Jessenia ch Procedure   Dermatology, Surgery, Urology  [facility]

## 2023-07-25 ENCOUNTER — VIRTUAL VISIT (OUTPATIENT)
Dept: NUTRITION | Facility: CLINIC | Age: 38
End: 2023-07-25
Payer: COMMERCIAL

## 2023-07-25 DIAGNOSIS — E66.09 CLASS 2 OBESITY DUE TO EXCESS CALORIES WITHOUT SERIOUS COMORBIDITY WITH BODY MASS INDEX (BMI) OF 36.0 TO 36.9 IN ADULT: Primary | ICD-10-CM

## 2023-07-25 DIAGNOSIS — E66.812 CLASS 2 OBESITY DUE TO EXCESS CALORIES WITHOUT SERIOUS COMORBIDITY WITH BODY MASS INDEX (BMI) OF 36.0 TO 36.9 IN ADULT: Primary | ICD-10-CM

## 2023-07-25 PROCEDURE — 97803 MED NUTRITION INDIV SUBSEQ: CPT | Mod: VID | Performed by: DIETITIAN, REGISTERED

## 2023-07-25 NOTE — PATIENT INSTRUCTIONS
NUTRITION INTERVENTION:   Nutrition Education (Application):  Cardiometabolic Food Plan Materials by [employer] [facility] [employer]     Discussed healthy eating patters, portion sizes, whole grains, fats, protein & fiber    Reviewed labels focusing on carb, sugar and fiber servings    Reviewed and planned out some recipes for breakfast and snacks     Reviewed healthy sleep patterns and times of eating on night shift       Long Term Goals:   Goal: Lipid profile; Increase HDL > 49mg/dL, Decrease LDL <100mg/dL within 6 months   Goal: Lose 20-24lbs in 6 months, recommend average 1-2lbs per week of weight loss.         Short Term Goals:  Goal 1: Improvement Shown: Focus on having a healthy balanced breakfast daily for the next nine weeks when you get home before bed around 8-9am.   Incorporate a whole grain at breakfast ex oatmeal 1 serving 15g of carbs naturally gluten free with more healthy fats such as serving of walnuts and 1 fruit serving 15g of carbs such as blueberries. Try 2 servings (12g) of lean protein on side such as 2 eggs, 2 turkey or chicken sausages.    Try a smoothie 1-2x a week for breakfast for the next six weeks see recipe discussed. add in unsweetened flax, hemp or coconut milk, 1 tbsp of healthy fat such as flax seed ground or lexis seed ground. serving of fruit 1 cup of berries. Veggie (optional), 1 scoop of plant based protein powder or collagen powder, 1 serving of fruit and veggie such as kale, spinach, or veggie powder even spirulina      Goal 2: Improvement Shown: Try to have 1 snack daily between meals that includes 1 fruit serving 15g of carbs and healthy fat and or lean protein for the next nine weeks.   - see meal plan and recipe ideas in the cardi metabolic guides provided.     2 hard boiled eggs.   Greek Yogurt with Blackberries  Fresh Yellow Pear or veggie (radishes, bell peppers, baby cucumbers, carrots or sugar snap peas) with Hummus - try the mini to go packs to help with  portion control   Marinated Olives* and Kefir   Purple Plum with Mixed Nuts  Celery with [place] Butter  Dark Chocolate, 70% or higher Cocoa and Pistachio Nuts  Banana with almond butter or sunflower seed butter   Apple with peanut butter topped with cinnamon   Baby cucumber or carrots, bell pepper, radishes or sugar snap peas with individual servings of guac   Low sodium cottage with peaches or mandarin oranges   Sunflower seeds with strawberries   Turkey jerky or 100% grass fed beef jerky   Plant based protein bar   Simple Mills [place] Flour Crackers or Yareli Gone Cracker dip with hummus or guac   Avocado on slice of gluten free bread recommend base culture brand or 100% whole grain bread     Goal 3: Improvement Shown: Monitor portion sizes - Focus on 70g of protein per day. 21g at meals and  - measure out food portions for the next six weeks to gain a better understanding of what a serving size actually is.  Specifically your rice portions. Swap for quinoa or cauliflower rice to help with the transition and follow more gluten free foods - review handout for portions   - Consider just having 2 serving of grains per day (30g) and swap for lower carb/sugar options such as non-starchy veggies.   - Read labels for low sugar and carbs.       Goal 4: Improvement Shown: Recommend the following supplements to start   One Multivitamin by Pure encapsulations - 1 capsule per day with food when you go to start your work shift   Nordic Naturals Plus vitamin D - 1-2 capsules daily with food ----https://[email or web address]/consumers/ultimate-omega-[email or web address]     Look on amazon to find the supplement recommendations or talk with the [facility] to special order. They may be able to offer a discount as well.     Goal 5: Improvement Shown: Have a healthy balanced dinner before going off to your night shift at around 7pm for the next nine weeks. Have a healthy balanced lunch at work 11-12am.     Goal 6: Improvement  Shown: Get at least 6-8 hours of sleep per day to help regulate circadian rhythm.     Goal 7: Focus on adding in walking at least 2miles 5-7 days per week about 45 mins. Also try to cynthia in [id number] steps at work and at home. In future look at more jogging and sprinting.     Goal 8: Check out [email or web address] Technologiesea[employer]-wellness-journal-a-holistic-guide-for-health-wellness-vitality    Mediterranean Approach: Eat whole, unprocessed real foods in their unprocessed forms such as fruits, vegetables, whole grains (prefer gluten free), nuts, legumes, extra virgin olive oil, spices, modest amounts of poultry, and fish.      Avoid inflammatory foods: Eliminate gluten found in wheat, barley, rye, oats, kamut, and spelt for at least 3 weeks to identify any hidden reaction. Gluten sensitivity or allergy can cause many different types of symptoms form migraines to fatigue to weight gain.  If symptoms go away this is a clue you may be reactive to gluten.  Dairy can also be inflammatory consider eliminating for 3 weeks as well. The proteins like casein and whey in dairy can irritate and inflame your gut. Also the sugar lactase in dairy can cause digestive issues in addition to blood sugar spikes.     Cardiometabolic Food plan - 1,800-2,200 calories per day   Protein 10-12 servings per day - include at each meal to stabilize blood sugars   (Choose 3oz or 21g per meal and aim for 1oz of 7g for snacks)   Strive for 1-2 servings of fish per week especially of higher omega-3 fatty acid containing fish such as salmon.   Legumes 2 serving per day   Dairy alternatives 2-3 serving per day   Nuts and seeds 3-4 servings per day - great to incorporate as snacks  Fats and oils 4 servings per day   Non starchy vegetables 8-10 servings per day   Starchy vegetable limit 1 serving per day as they tend to impact blood sugar (they are moderate-GI).   Fruits 2 servings per day - best to couple with a  little bit of protein or fat to offset a rise in blood sugars (they are low-moderate-GI foods).   Whole grains 2 serving per day - try gluten free whole grains instead      Incorporate protein powder daily:  Plant based hemp (recommended brands: [employer]itoMeet[employer] [place], Nutiva, Just Hemp Protein, Zinkia Red Mill)    Plant based pea (recommended brands: Naked Pea, Now Sports). If you want to try a combo of pea and hemp the brand Barraza in vanilla or chocolate is a great option.   Try Bone broth protein powder or collagen peptides in liquid bone broth, vegetable broth or 12 oz of water as snack. The bone broth powder and collagen can be used for soups as well. This can help provide essential amino acids and minerals that heal your gut as well as balance blood sugars. A great option if you have a hard time tolerating solids.     Can also consider pre made shakes if unable to make smoothies.      Brand examples that are gluten and dairy free to try:   1) Orgain Vegan Protein Shakes, 20g of Plant Based Protein, Creamy Chocolate - Gluten Free, No Dairy, Soy, or Preservatives, No Added Sugar  2) Pirq, Vegan Protein Shake, Turmeric Curcumin, Lia, Plant-Based Protein Drink, Gluten-Free, Dairy-Free, Soy-Free, Non-GMO, Vegetarian, Kosher, Keto, Low Carb, Low Calorie  3) OWYN Pro Elite Vegan Plant-Based High Protein Shake, 35g Protein, 9 Amino Acids, Omega-3, Prebiotics, Superfoods Greens for Workout and Recovery, 0g Net Carbs, Zero Sugar, Keto  4) Ripple Vegan Protein Shake  Chocolate  20g Nutritious Plant Based Pea Protein  Shelf Stable  No GMOs, Soy, Nut, Gluten, Lactose  5) [employer] Glucose Support 1.2 Plant based, dairy free, low glycemic index  [email or web address].i/products/glucose-support-1-2-vanilla    Choose Low Glycemic (GI) foods: Regulate your sugar levels by eating foods that do not spike blood sugars.  Eat low -GI foods so only small fluctuations in blood glucose and insulin levels are produced.      Examples  of low-GI foods: nuts, seeds, GF oats, most vegetables especially non-starchy and fruits.     Medium or high-GI foods should be eaten with a protein or fat, both of which blunt the glycemic effect of these foods. This reduces the overall glycemic impact of a meal.   Ex: Most grains and starchy veggies are medium/high GI.     Avoid foods containing refined sugars, artificial sweeteners, and refined grains they are considered high-GI because they lead to sharp increases in blood sugars levels, which increase insulin sensitivity causing increased TG, and low good cholesterol (HDL).   Ex: cakes, cookies, pies, bread, sodas, fruit drinks, presweetened tea, coffee drinks, energy or sport drinks, flavored milk and other processed foods.     Choose foods high in fiber: Aim for at least 5 grams of fiber per serving of food or a total of 25-35 grams fiber per day. Remember, when looking at the label, you can take the fiber away from the total carbs. Ex:15g of total carbs - 4g of fiber = 11g net carbs     Insoluble fiber acts like a bulky  inner broom,  sweeping out debris from the intestine and creating more motility and movement.      Soluble fiber attracts water and swells, creating a gel that slows digestion.  Also, slows the release of glucose from foods into the blood which stops spikes in blood sugar levels.  Soluble fiber traps toxins in the gut, helping to carry them to excretion and provides healthy bacteria in the digestive tract.     Choose High Quality Fats: Adding anti-inflammatory fats into your diet such as fish (salmon, herring, mackerel, and sardines), omega 3 eggs, lexis seeds, ground flax seeds/milk, hemp seeds/milk, walnuts and some other certain leafy greens will increase omega-3 fats to omeaga-6 fats ratio.     Therapeutic fats both monounsaturated and polyunsaturated to include daily: ground flaxseeds, unsalted mixed nuts, avocados, olives, extra-virgin olive oil.     Emphasize high-quality oils and fats  in the diet daily such as avocado oil, coconut oil, flaxseed, olive, sesame. Ex: 1 tsp to 1 tbsp of MCT oil from coconuts can be added into coffee, smoothies, and salad dressings per day.   Avoid trans fats found in processed foods     NUTRITION RESOURCES:  IFM Cardiometabolic Packet   Functional Nutrition Fundamentals   Comprehensive Guide  Meal plan with recipes   Suggestions for better sleep handout   Sleep Smarter Book by Pratik Ambriz  Check out the sleep foundation [email or web address].sleepfoundation.org/physical-health/sleep-and-overeating

## 2023-07-25 NOTE — PROGRESS NOTES
Medical Nutrition Therapy  Visit Type: Reassessment and intervention    Visit Details    How would you like to obtain your AVS? mychart   If the correspondence for visit is dropped, how would you like your dietitian to reconnect with you:   call back by phone? Yes   Will anyone else be joining your video visit or telephone call? no5    Type of service:  Video Visit    Start Time: 2:31pm    End Time: 3:06pm     Originating Location (pt. Location): home    Distant Location (provider location):  [facility] virtual off-site    Platform used for Video Visit: adriana       Referring Provider: Eddy Butt  [facility]     REASON FOR REFERRAL:   Kiran Arroyo is a 37 year old male who is interested in Medical Nutrition Therapy (MNT) and education related to weight management.     Changes since previous consult Yes        Behavior Status:Improvement shown  Barriers Include:Consistency, time, night shift worker     Goals: What would you like to work on that will help you most over the next several weeks? Meal planning and staying consistent with the goals previously set around hydration, protein, sleep and exercise. Focus on sleep to help regulate appetite and hunger cues     Overall his goals have gotten a lot better, ,he has been doing more meal planning. He is still struggling with some inconsistency and gained a few lbs back. He had lost 13lbs and now lost 9lbs. Having weight loss has been huge and joint pain has decreased. He has been eating less sodium and lower portions of carbs/sweets. He cut back on soda even diet which has been a problem for him. He has been focusing on increasing his water intake and only having small amounts of organic juice. He has been continuing to reference the nutrition resources (meal plans/recipes) provided at initial. He has found them to be very helpful. He has been trying to stay on schedule and time his meals which has helped  with monitoring his dietary changes/behaviors. His wife has been a huge support as well in making and or meal planning. He has continued to focus on adequate protein. He has incorporated the plant based protein shakes. He has found this helpful and makes him less hungry. He still tends to get cravings and will up his overall calories to like [id number]. He feels he has made so much progress so gets really bummed when he has the days that he overeats and can't control his portions. He hasn;t been able to identify any triggers as to why this happens. He is still doing the nightshift, he has tried to walk when he gets the cravings but he still has intense cravings. He joined a gym and found that exercise does help with not wanting to eat. It might be related to skipping meals, and not getting enough sleep. His wife has been holding him accountable noticing that he eats a lot and will make healthy snacks to keep on track. Depending on the day he is getting about 5-10 hours of sleep. He tends to eating more when fighting to stay up. He still has about a hour of nightshifts but trying to  some day shifts and notices he doesn't eat as much. At night he gets home and used to eat so will tend to overeat. Discussed trying to shift meals to eating during day to avoid over eating later in the night.His cravings tend to hit during his nightshift. He tries to do more protein bars and watch portion sizes. He is starting to look more now at labels. He is looking into more meal prepping services or wife helps where he doesn't get into a situation where he is so hungry eating three meals while trying to make just one.     Changes since previous consult Yes        Behavior Status:Improvement shown  Barriers Include:Consistency, time, night shift worker     Goals: What would you like to work on that will help you most over the next several weeks? Meal planning and staying consistent with the goals previously set around hydration,  protein, sleep and exercise      Things have been going well since our initial consult. He found the resources easy to follow but hard sometimes to commit with family schedule. Having more nuts and seeds with snacks have helped. He also has been doing the smoothies which have helped keep from being so hungry. He has worked on portion sizes and has a lot a total of 7lbs. He has had less joint pain. He has been having something to eat as discussed for breakfast at 8-9am. This has helped with his sleep patterns. He has been working on less broken sleep patterns. He took a week off of work and slept from 9pm-7am for a week and that helped with his circadian limit n ow sleeping 6 hours. Usually if  Has a heavy meal before gets stomach ache and wakes up but since having hte smoothies, extra protein and fiber he doesn't have the cravings and wanting to eat anything in the fridge. He feels he is trying to fuel his body now instead of just eating to try to stay awake and please cravings. He did get a multivitamin supplement, fish oil and metamucil. If he ends up skipping a meal or in between meals has been doing a high protein shake. He ordered and is waiting for the sleep smarter book to come in. He wants to continue to focus on weight loss and body composition. He was up to 24-25% body fat and now down to 22% body fat. He has only changed his dietary and sleep patterns not working out. He wants to focus on walking more every day before work between 3-3:45pm walk dog. Wife has been helping to make meals with more protein and veggies. He has been consistent with meals before work. He wants to keep in mind more water in between meals. He has been trying to drink 2-3 bottles of 16oz water. Also been getting ride of sugar. He wants to be more consistent some days is better than others. He wants to improve what he is already working on before moving onto the next goal. He doesn't want it to be harder for him adding in more  goals. He wants to focus on other ways to sub healthy foods into his diet. He just wants to revisit and make better discipline with habits.    Notes 4/21/23  INITIAL NUTRITION ASSESSMENT:   Would like a better understanding of how his diet can benefit him. He works nights and tries to function the best during the day for his family. He is eating on the go constantly. Trying to function with a normal life with night shitf. He works 10 hour days. The day before his work scheudle. Eats at 7am - rice, beans, chicken, hotdog, spaghetti (any dinner leftovers from day before)  and tries to take power nap before going in. He eats lunch at work 12pm-2am sometimes skips and just goes till eating at home. 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 He will than sleep about 4-5 hours and snack at work. Peanut butter sandwich, leftovers, poptart. When at home normally eats a sandwich or goes out to eat.He doesn't know if he is messing up his metabolism with this schedule. Should he still eat normally sometimes sleeps right after eating.   He is accompanied by self          4/21/2023     8:17 AM   Neurological   Migraine Headaches Past              4/21/2023     8:17 AM   Gastrointestinal   Diarrhea Past   Gas/bloating Past   Abdominal Pain Past           4/21/2023     8:17 AM   Food Sensitivities   Lactose intolerance Current          4/21/2023     8:17 AM   Endocrine   Overweight/obesity Current          4/21/2023     8:17 AM   Skin   Acne Current   Hives Current          4/21/2023     8:17 AM   Cardiopulmonary   High Cholesterol Current        Past Medical History:  Past Medical History:   Diagnosis Date     Obesity 9/9/2016       Previous Surgeries:   Past Surgical History:   Procedure Laterality Date     WISDOM TOOTH EXTRACTION          Family History:  Family History   Problem Relation Age of Onset     No Known Problems Mother      Diabetes Maternal Grandmother      Breast Cancer Maternal Grandmother       Cancer Paternal Grandmother      Colon Cancer Paternal Grandfather         Lifestyle History:      4/21/2023     8:17 AM   Lifestyle   Do you feel your life is stressful right now?  Yes   What is the cause(s) of stress in your life?  Work;Health Concerns;Multi-tasking and multiple deadlines to meet;Over thinking and too much analysis   Are you currently implementing any strategies to help manage stress? Yes   What are you doing to manage stress?  TV;Take time for self        Exercise History:      4/21/2023     8:17 AM   Exercise   Does your occupation require extended periods of sitting?  Yes   Does your occupation require extended periods of repetitive movements (ex: walking or lifting)?  Yes   Do you currently participate in any forms of exercise? Yes   Check all the exercises you participate in: Walking;Weight Lifting;Other Cardio   How many times per week do you exercise? 2 times   How long do you usually exercise? 30 min        Sleep History:      4/21/2023     8:17 AM   Sleep   How many hours (on average) do you sleep per night? 4-6   What time do you turn off the lights? 10 PM   How long does it take for you to fall asleep? 30-45 mins   What time do you stop using electronic devices? 10 PM   What time do you wake up? 3 PM   When do you eat your first meal?  7 PM   Do you feel well-rested during the day?  No   Do you take naps?  Yes   Do you have a comfortable bedroom environment (cool, quiet, dark, etc)? Yes   Do you have a sleep routine/ ritual that you do before bed?  Yes   How many hours do you spend per day looking at a screen (TV, computer, tablet and phone)? 0 to 2   Select all factors that apply to your current sleep habits: Difficulty falling asleep;Wake up in the middle of the night;Sleep eating;Snore loudly or have been told you stop breathing;Eat large meals within 3 hours of going to bed;Night sweats;No control over nighttime eating        Nutrition History:      4/21/2023     8:17 AM    Nutrition   Have you ever had a nutrition consultation? Yes   Do you currently follow a special diet or nutritional program? No   What do you feel are the biggest barriers getting in the way of achieving you nutritional goals? Motivation/Readiness to change;Lack of prep/cooking skills;Work (such as lack of time to eat, unhealthy choices, etc.);Lack of control over emotions/behaviors   Do you have any food allergies, sensitivities or intolerances?  No           4/21/2023     8:17 AM   Digestion   Do you experience stomach pains/cramping? Monthly   Do you experience bloating?  Monthly   Do you experience gas?  2-3 times per week   Do you experience heartburn/acid reflux/indigestion? Weekly   How often do you have a bowel movement? Every other day   What is a typical bowel movement like for you? Select all that apply: Varies a lot          4/21/2023     8:17 AM   Food Access:    Who does the grocery shopping? Spouse/Partner   How often is grocery shopping done? 2 times per week   Where do you usually receive your groceries from? Select all that apply: [employer];Target;[employer]co;[employer];Whole Foods   Do you read food labels? Yes   What do you look at?  Calories;Organic;Fats;Carbs;Fiber;Sugar   Who does the cooking? Select all that apply: Self;Spouse/Partner   How many meals do you eat out per week?  3 to 5   What restaurants do you typically choose? Bar/Grills          4/21/2023     8:17 AM   Daily Patterns:   How many days per week do you have breakfast? 4   How many days per week do you have lunch? 4   How many days per week do you have dinner? 5   How many days per week do you have snacks? 5          4/21/2023     8:17 AM   Protein Intake:   How many times per day do you typically consume a protein source(s)? 5 or more   What types of protein do you currently eat?  Ground Beef;Beef Ribs;Beef Roast;Beef Hot Dogs;Pork Chops;Pork Ribs;Diaz/[place] Diaz;Sausage;Other Pork;[place];Tuna;Shrimp;Lobster;Other  Fish;Ground Cervantes;Cervantes Chops;Chicken Breast;Ground Chicken;Chicken Sausage;Other Chicken;Turkey Breast;Turkey Thighs;Turkey Diaz           4/21/2023     8:17 AM   Fat Intake:    How many times per day do you typically consume healthy fat(s)? 5 or more   What types of health fats do you currently eat? Select all that apply:  Almonds;Walnuts;Other nuts           4/21/2023     8:17 AM   Fruit Intake:    How many times per day do you typically consume fruits? 3   What types of fruit do currently eat? Apple;Banana;Pears           4/21/2023     8:17 AM   Vegetable Intake:    How many times per day do you typically consume vegetables? 2   What types of vegetables do you currently eat? Asparagus;Beans;Broccoli;Celery;Greens (madiha, kale etc);Spinach;Squash, winter (acorn, butternut);Water chestnuts          4/21/2023     8:17 AM   Grain Intake:    How many times per day do you typically consume grains? 2   What types of grains do currently eat? Select all that apply:  Breads (gluten free);Brown rice (gluten free);Pasta (gluten free)           4/21/2023     8:17 AM   Dairy Intake:    How many times per day do you typically consume dairy? 4   What types of dairy do currently eat? Select all that apply:  Milk;Cheese;Yogurt;Ice cream           4/21/2023     8:17 AM   Non-Dairy Alternative Intake:    How many times per day do you typically consume non-dairy alternatives? 2   What types of non-dairy alternatives do currently eat? Select all that apply:  [place] milk;Pea protein milk;Cashew milk;Goat cheese;Other ice cream           4/21/2023     8:17 AM   Sweets Intake:    How many times per day do you typically consume sweets? 3          4/21/2023     8:17 AM   Beverage Intake:    How many 8 oz cups of water do you typically consume per day?  4 to 5   How many 8 oz cups of caffeine do you typically consume per day?  0   How many drinks of alcohol do you typically consume per week (1 drink = 5 oz wine, 12 oz beer, 1.5 oz  spirits)?   0           4/21/2023     8:17 AM   Lifestyle Recall:    What time did you wake up? 9 AM   What time did you go to sleep? 9 AM   What time did you have breakfast? 7-8 AM   Where did you have breakfast?  Work   What time did you have a morning snack? Other   Where did you have your morning snack? Home   What time did you have lunch? Other   Where did you have lunch?  Work   What time did you have an afternoon snack? Other   Where did you have your afternoon snack? Work   What time did you have dinner? Other   Where did you have dinner?  Work   What time did you have an evening snack? 11 PM-12 AM   Where did you have your evening snack? Home   What time of day did you exercise? No Exercise          Additional concerns:   MEDICATIONS:  Current Outpatient Medications   Medication Sig Dispense Refill     EPINEPHrine (ANY BX GENERIC EQUIV) 0.3 MG/0.3ML injection 2-pack Inject 0.3 mLs (0.3 mg) into the muscle as needed for anaphylaxis 2 each 1            No data to display                  ALLERGIES:   No Known Allergies     .na  LABS:  Last Basic Metabolic Panel:  Lab Results   Component Value Date     04/19/2023     02/11/2021      Lab Results   Component Value Date    POTASSIUM 4.0 04/19/2023    POTASSIUM 4.2 02/11/2021     Lab Results   Component Value Date    CHLORIDE 105 04/19/2023    CHLORIDE 106 02/11/2021     Lab Results   Component Value Date    MARY 9.4 04/19/2023    MARY 9.8 02/11/2021     Lab Results   Component Value Date    CO2 26 04/19/2023    CO2 26 02/11/2021     Lab Results   Component Value Date    BUN 13 04/19/2023    BUN 20 02/11/2021     Lab Results   Component Value Date    CR 0.88 04/19/2023    CR 1.01 02/11/2021     Lab Results   Component Value Date    GLC 93 04/19/2023    GLC 83 02/11/2021    GLC 62 01/23/2019       Last Glucose Profile:   Hemoglobin A1C   Date Value Ref Range Status   01/23/2019 5.4 3.5 - 6.0 % Final       Last Lipid Profile:   Cholesterol   Date Value Ref  "Range Status   04/19/2023 163 <200 mg/dL Final   02/11/2021 140 <=199 mg/dL Final   01/23/2019 153 <=199 mg/dL Final     Direct Measure HDL   Date Value Ref Range Status   04/19/2023 37 (L) >=40 mg/dL Final   02/11/2021 30 (L) >=40 mg/dL Final   01/23/2019 36 (L) >=40 mg/dL Final     LDL Cholesterol Calculated   Date Value Ref Range Status   04/19/2023 102 (H) <=100 mg/dL Final   02/11/2021 65 <=129 mg/dL Final   01/23/2019 85 <=129 mg/dL Final     Triglycerides   Date Value Ref Range Status   04/19/2023 120 <150 mg/dL Final   02/11/2021 226 (H) <=149 mg/dL Final   01/23/2019 161 (H) <=149 mg/dL Final     No results found for: CHOLHDLRATIO    Most recent CBC:  Recent Labs   Lab Test 02/11/21  1212   WBC 6.8   HGB 17.0   HCT 47.6        Most recent hepatic panel:  Recent Labs   Lab Test 04/19/23  1025 02/11/21  1212   ALT 55 40   AST 36 28     Most recent creatinine:  Recent Labs   Lab Test 04/19/23  1025 02/11/21  1212   CR 0.88 1.01       No components found for: GFRESETIMATEDLASTLAB(gfrestblack:1@  Lab Results   Component Value Date    ALBUMIN 4.3 04/19/2023       Last Thyroid Profile:   TSH   Date Value Ref Range Status   04/19/2023 0.64 0.40 - 4.00 mU/L Final       Last Mineral Profile:   No results found for: ZA, IRON, FEB    Autoimmune & Inflammatory   CRP   Date Value Ref Range Status   02/11/2021 0.2 0.0 - 0.8 mg/dL Final         Last Vitamin Profile:   No results found for: UBS040, RSFU884, [id number], VITD3, D2VIT, D3VIT, DTOT, [id number], [id number], [id number], [id number], [id number], [id number]    ANTHROPOMETRICS:  Vitals:   BP Readings from Last 1 Encounters:   04/19/23 130/88     Pulse Readings from Last 1 Encounters:   04/19/23 94     Estimated body mass index is 36.94 kg/m  as calculated from the following:    Height as of 4/19/23: 1.918 m (6' 3.5\").    Weight as of 4/19/23: 135.9 kg (299 lb 8 oz).    Wt Readings from Last 5 Encounters:   04/19/23 135.9 kg (299 lb 8 oz)   09/22/22 136.8 kg " (301 lb 8 oz)   02/11/21 93.5 kg (206 lb 3.2 oz)   01/07/20 132.5 kg (292 lb)   04/17/19 131.2 kg (289 lb 5 oz)     NUTRITION DIAGNOSIS:   1.Obesity related to food and nutrition related knowledge deficit (excessive CHO intake, Inadequate fiber intake, inappropriate intake of healthy omega 3 fatty acids) as evidenced by nutrition intake record, inadequate sleep (working night shift) BMI 36, elevated Total chol, elevated , and low HDL 37.       NUTRITION INTERVENTION:   Nutrition Education (Application):  Cardiometabolic Food Plan Materials by [employer] [place] [employer]     Discussed healthy eating patters, portion sizes, whole grains, fats, protein & fiber    Reviewed labels focusing on carb, sugar and fiber servings    Reviewed and planned out some recipes for breakfast and snacks     Reviewed healthy sleep patterns and times of eating on night shift       Long Term Goals:   Goal: Lipid profile; Increase HDL > 49mg/dL, Decrease LDL <100mg/dL within 6 months   Goal: Lose 20-24lbs in 6 months, recommend average 1-2lbs per week of weight loss.         Short Term Goals:  Goal 1: Improvement Shown: Focus on having a healthy balanced breakfast daily for the next nine weeks when you get home before bed around 8-9am.   Incorporate a whole grain at breakfast ex oatmeal 1 serving 15g of carbs naturally gluten free with more healthy fats such as serving of walnuts and 1 fruit serving 15g of carbs such as blueberries. Try 2 servings (12g) of lean protein on side such as 2 eggs, 2 turkey or chicken sausages.    Try a smoothie 1-2x a week for breakfast for the next six weeks see recipe discussed. add in unsweetened flax, hemp or coconut milk, 1 tbsp of healthy fat such as flax seed ground or lexis seed ground. serving of fruit 1 cup of berries. Veggie (optional), 1 scoop of plant based protein powder or collagen powder, 1 serving of fruit and veggie such as kale, spinach, or veggie powder even spirulina      Goal  2: Improvement Shown: Try to have 1 snack daily between meals that includes 1 fruit serving 15g of carbs and healthy fat and or lean protein for the next nine weeks.   - see meal plan and recipe ideas in the cardi metabolic guides provided.       2 hard boiled eggs.     Greek Yogurt with Blackberries    Fresh Yellow Pear or veggie (radishes, bell peppers, baby cucumbers, carrots or sugar snap peas) with Hummus - try the mini to go packs to help with portion control     Marinated Olives* and Kefir     Purple Plum with Mixed Nuts    Celery with [place] Butter    Dark Chocolate, 70% or higher Cocoa and Pistachio Nuts    Banana with almond butter or sunflower seed butter     Apple with peanut butter topped with cinnamon     Baby cucumber or carrots, bell pepper, radishes or sugar snap peas with individual servings of guac     Low sodium cottage with peaches or mandarin oranges     Sunflower seeds with strawberries     Turkey jerky or 100% grass fed beef jerky     Plant based protein bar     Simple Mills [place] Flour Crackers or Yareli Gone Cracker dip with hummus or guac     Avocado on slice of gluten free bread recommend base culture brand or 100% whole grain bread     Goal 3: Improvement Shown: Monitor portion sizes - Focus on 70g of protein per day. 21g at meals and  - measure out food portions for the next six weeks to gain a better understanding of what a serving size actually is.  Specifically your rice portions. Swap for quinoa or cauliflower rice to help with the transition and follow more gluten free foods - review handout for portions   - Consider just having 2 serving of grains per day (30g) and swap for lower carb/sugar options such as non-starchy veggies.   - Read labels for low sugar and carbs.       Goal 4: Improvement Shown: Recommend the following supplements to start     One Multivitamin by Pure encapsulations - 1 capsule per day with food when you go to start your work shift     N[place]ic Naturals Plus  vitamin D - 1-2 capsules daily with food ----https://[email or web address].Amplify.LA/consumers/ultimate-omega-d3     Look on amazon to find the supplement recommendations or talk with the [facility] to special order. They may be able to offer a discount as well.     Goal 5: Improvement Shown: Have a healthy balanced dinner before going off to your night shift at around 7pm for the next nine weeks. Have a healthy balanced lunch at work 11-12am.     Goal 6: Improvement Shown: Get at least 6-8 hours of sleep per day to help regulate circadian rhythm.     Goal 7: Focus on adding in walking at least 2miles 5-7 days per week about 45 mins. Also try to cynthia in [id number] steps at work and at home. In future look at more jogging and sprinting.     Goal 8: Check out https://[employer]/collections/daily[employer]ers/products/dailyCompumatrix-wellness-journal-a-holistic-guide-for-health-wellness-vitality    Mediterranean Approach: Eat whole, unprocessed real foods in their unprocessed forms such as fruits, vegetables, whole grains (prefer gluten free), nuts, legumes, extra virgin olive oil, spices, modest amounts of poultry, and fish.      Avoid inflammatory foods: Eliminate gluten found in wheat, barley, rye, oats, kamut, and spelt for at least 3 weeks to identify any hidden reaction. Gluten sensitivity or allergy can cause many different types of symptoms form migraines to fatigue to weight gain.  If symptoms go away this is a clue you may be reactive to gluten.  Dairy can also be inflammatory consider eliminating for 3 weeks as well. The proteins like casein and whey in dairy can irritate and inflame your gut. Also the sugar lactase in dairy can cause digestive issues in addition to blood sugar spikes.     Cardiometabolic Food plan - 1,800-2,200 calories per day     Protein 10-12 servings per day - include at each meal to stabilize blood sugars   (Choose 3oz or 21g per meal and aim for 1oz of 7g for snacks)    - Strive for 1-2 servings of fish per week especially of higher omega-3 fatty acid containing fish such as salmon.     Legumes 2 serving per day     Dairy alternatives 2-3 serving per day     Nuts and seeds 3-4 servings per day - great to incorporate as snacks    Fats and oils 4 servings per day     Non starchy vegetables 8-10 servings per day     Starchy vegetable limit 1 serving per day as they tend to impact blood sugar (they are moderate-GI).     Fruits 2 servings per day - best to couple with a little bit of protein or fat to offset a rise in blood sugars (they are low-moderate-GI foods).     Whole grains 2 serving per day - try gluten free whole grains instead      Incorporate protein powder daily:    Plant based hemp (recommended brands: [place] Breckenridge, Nutiva, Just Hemp Protein, [employer])      Plant based pea (recommended brands: Naked Pea, Now Sports). If you want to try a combo of pea and hemp the brand Barraza in vanilla or chocolate is a great option.     Try Bone broth protein powder or collagen peptides in liquid bone broth, vegetable broth or 12 oz of water as snack. The bone broth powder and collagen can be used for soups as well. This can help provide essential amino acids and minerals that heal your gut as well as balance blood sugars. A great option if you have a hard time tolerating solids.     Can also consider pre made shakes if unable to make smoothies.      Brand examples that are gluten and dairy free to try:   1) Orgain Vegan Protein Shakes, 20g of Plant Based Protein, Creamy Chocolate - Gluten Free, No Dairy, Soy, or Preservatives, No Added Sugar  2) Pirq, Vegan Protein Shake, Turmeric Curcumin, Lia, Plant-Based Protein Drink, Gluten-Free, Dairy-Free, Soy-Free, Non-GMO, Vegetarian, Kosher, Keto, Low Carb, Low Calorie  3) OWYN Pro Elite Vegan Plant-Based High Protein Shake, 35g Protein, 9 Amino Acids, Omega-3, Prebiotics, Superfoods Greens for Workout and Recovery, 0g Net Carbs,  Zero Sugar, Keto  4) Ripple Vegan Protein Shake  Chocolate  20g Nutritious Plant Based Pea Protein  Shelf Stable  No GMOs, Soy, Nut, Gluten, Lactose  5) Prylos Glucose Support 1.2 Plant based, dairy free, low glycemic index  https://shop.[employer]/products/glucose-support-1-2-vanilla    Choose Low Glycemic (GI) foods: Regulate your sugar levels by eating foods that do not spike blood sugars.  Eat low -GI foods so only small fluctuations in blood glucose and insulin levels are produced.      Examples of low-GI foods: nuts, seeds, GF oats, most vegetables especially non-starchy and fruits.     Medium or high-GI foods should be eaten with a protein or fat, both of which blunt the glycemic effect of these foods. This reduces the overall glycemic impact of a meal.   Ex: Most grains and starchy veggies are medium/high GI.     Avoid foods containing refined sugars, artificial sweeteners, and refined grains they are considered high-GI because they lead to sharp increases in blood sugars levels, which increase insulin sensitivity causing increased TG, and low good cholesterol (HDL).   Ex: cakes, cookies, pies, bread, sodas, fruit drinks, presweetened tea, coffee drinks, energy or sport drinks, flavored milk and other processed foods.     Choose foods high in fiber: Aim for at least 5 grams of fiber per serving of food or a total of 25-35 grams fiber per day. Remember, when looking at the label, you can take the fiber away from the total carbs. Ex:15g of total carbs - 4g of fiber = 11g net carbs     Insoluble fiber acts like a bulky  inner broom,  sweeping out debris from the intestine and creating more motility and movement.      Soluble fiber attracts water and swells, creating a gel that slows digestion.  Also, slows the release of glucose from foods into the blood which stops spikes in blood sugar levels.  Soluble fiber traps toxins in the gut, helping to carry them to excretion and provides healthy bacteria  in the digestive tract.     Choose High Quality Fats: Adding anti-inflammatory fats into your diet such as fish (salmon, herring, mackerel, and sardines), omega 3 eggs, lexis seeds, ground flax seeds/milk, hemp seeds/milk, walnuts and some other certain leafy greens will increase omega-3 fats to omeaga-6 fats ratio.     Therapeutic fats both monounsaturated and polyunsaturated to include daily: ground flaxseeds, unsalted mixed nuts, avocados, olives, extra-virgin olive oil.     Emphasize high-quality oils and fats in the diet daily such as avocado oil, coconut oil, flaxseed, olive, sesame. Ex: 1 tsp to 1 tbsp of MCT oil from coconuts can be added into coffee, smoothies, and salad dressings per day.     Avoid trans fats found in processed foods     NUTRITION RESOURCES:  1. IFM Cardiometabolic Packet     Functional Nutrition Fundamentals     Comprehensive Guide    Meal plan with recipes   2. Suggestions for better sleep handout   3. Sleep Smarter Book by Pratik Ambriz  4. Check out the sleep foundation https://www.sleepfoundation.org/physical-health/sleep-and-overeating      PATIENT'S BEHAVIOR CHANGE GOALS:   See nutrition intervention for patient stated behavior change goals.     MONITOR / EVALUATE:  Registered Dietitian will monitor/evaluate the following:     Beliefs and attitudes related to food    Food and nutrition knowledge / skills    Food / Beverage / Nutrient intake     Pertinent Labs    Progress toward meeting stated nutrition-related goals    Readiness to change nutrition-related behaviors    Weight change    Digestion     COORDINATION OF CARE:  Follow up with referring provider as needed       FOLLOW-UP:  Follow up scheduled for Oct 24th at 10am     Time spent in minutes: 34 minutes 2 units   Encounter: Individual    Ave Johnson RD, CLT, LD  Integrative Registered Dietitian

## 2023-08-17 ENCOUNTER — MYC MEDICAL ADVICE (OUTPATIENT)
Dept: UROLOGY | Facility: CLINIC | Age: 38
End: 2023-08-17
Payer: COMMERCIAL

## 2023-08-17 NOTE — LETTER
Sofya Beck,     Our records show that you are scheduled to have an upcoming in clinic procedure with Dr. House.     Future Appointments 8/17/2023 - 2/13/2024        Date Visit Type Length Department Provider     9/19/2023  3:30 PM VASECTOMY 60 min  UROLOGY Terry House MD              10/24/2023 10:00 AM VIDEO VISIT RETURN 60 min  NUTRITION Ave Johnson, RD                   Below I have attached some reading material for you to review. Please let us know if you have any questions or concerns.     Vasectomy    Understanding Vasectomy  Vasectomy is a simple, safe procedure that makes a man sterile (unable to father a child). It is the most effective birth control method for men.    Your Reproductive System  For pregnancy to occur, a man s sperm (male reproductive cells) must join with a woman s egg. To understand how a vasectomy works, you need to know how sperm are produced, stored, and released by the body.  The urethra is the tube in the center of the penis. It transports both urine and semen. When you have an orgasm, semen is ejaculated out of the urethra.  The seminal vesicles and the prostate gland secrete fluid called semen.  This sticky, white fluid helps nourish sperm and carry them along.  The epididymis is a coiled tube that holds the sperm while they mature.  The scrotum is a pouch of skin that contains the testes.  The testes are glands that produce sperm and male hormones.  The vas deferens is the tubes that carry the sperm from the epididymis to the penis.  Sperm carry genetic material.    Risks and Complications  A vasectomy is an outpatient (same day) procedure. It will be done in the clinic or in the same day surgery center. Before your vasectomy will be performed, you ll be asked to read and sign a consent form. This form stated you re aware of the possible risks and complications and understand that the procedure, though usually successful, is not guaranteed to make you sterile. Be  sure that you have all your questions answered before signing this form. After the procedure, if you have any of the following or other symptoms you re concerned about, call your doctor.    Possible Risks and Complications  Vasectomy is a safe procedure. But it does have risks, including bleeding and infection.  You may also have any of the following after surgery.  Sperm granuloma is a small, harmless lump that may form where the vas deferens is sealed off.  Sperm buildup (congestion) may cause soreness in the testes.  Anti-inflammatory medications can provide relief.  Epididymitis is inflammation that may cause scrotal aching. This often goes away without treatment. Occasionally, antibiotics are required.  Anti-inflammatory medications may provide relief.  Reconnection of the vas deferens can occur in rare cases. This makes you fertile again and can result in an unwanted pregnancy.  Sperm antibodies are a common response of the body to absorbed sperm. The antibodies can make you sterile, even if you later try to reverse your vasectomy.  Long term testicular discomfort may occur after surgery, but is very rare.    Vasectomy Preparation  Shave all hair from around the penis and the entire scrotum. You should do this on the day of the vasectomy: 2-4 hours prior to your appointment. This serves to cut down on the risk of infection. You may lather the scrotum with soap and water and shave with a disposable blade razor. Do not use an electric razor or depilatory hair creams.  After shaving the area, shower or bathe to remove all loose hairs.  Bring a scrotal support or tight cotton shorts with you on the day of your procedure.  Bring headphones/music if you would like to use during the procedure.  You may drive yourself to this procedure, only a local anesthetic is used.    During the Procedure  You will be asked to undress from the waist down and lie on the exam table. Anticeptic solution will be applied to the scrotal  areas. Sterile drapes are placed over you to help prevent infection. You will be given a local anesthetic into your scrotum to prevent you from feeling pain. Once the anesthetic takes effect, the doctor makes one puncture in each side of the scrotum with a pointed clamp. Each of the two vasa deferentia are lifted through this puncture site. The vasa are cut, and a section is removed. You may feel a pulling sensation during this process. The two cut ends are sealed by heat (cauterized) and also tied. The puncture is then sutured with a stitch.    After the Procedure  The local anesthetic begins to wear off after an hour or so. Any discomfort you feel is usually very mild. If you need it, pain reliever may help.    During Your Healing  Recovery time after a no-scalpel vasectomy is usually less than after a traditional vasectomy. For about a week, your scrotum may look bruised and slightly swollen.  You may also have a small amount of bloody discharge from the incision. This is normal.    To help make your recovery more comfortable, follow the tips below.  Stay off your feet as much as possible for the first few days to lessen the chance of swelling. Try to lie flat on a bed or sofa.  Reduce swelling by placing an ice pack or bag of frozen peas in a thin towel. Then place the towel on your scrotum.  Wear snug cotton briefs or an athletic supporter.  Take medications with acetaminophen (such as Tylenol) to relieve any discomfort.   Don t use aspirin, ibuprofen or naproxen.  Your doctor may give you a pain pill prescription.  Wait 48 hours before bathing.  Avoid heavy lifting or exercise for at least 10 days.  You can return to work in a day or two after the procedure.  You can begin having sex again two weeks after the procedure.    Note: You must use some form of birth control until your doctor says you re sterile.    Call your doctor if you notice any of the following after surgery:  Increasing pain or swelling in  your scrotum  A large black-and-blue area, or a growing lump (some bruising is normal)  Fever or chills  Increasing redness or drainage of the incision    Sex after Vasectomy  Vasectomy doesn t change your sexual function. So when you start having sex again, it should feel the same as before. A vasectomy also shouldn t affect your relationship with your partner. It s important to remember, though, that you won t be sterile right away. It will take time before you can have sex without the need for birth control.    Until you re sterile: After a vasectomy, some active sperm still remain in your semen. It will take time and many ejaculations before the sperm are completely gone. During this period, you must use another birth control method to prevent pregnancy. To make sure no sperm are left in your semen, you ll need to have at least one semen exam. You usually collect a semen sample at home and bring it to a lab. The sample is then checked under a microscope. You are sterile only when the sample(s) shows no evidence of sperm. Ask your doctor whether additional follow-up is needed.    After you re sterile: After your doctor tells you you re sterile, you no longer need to use any form of birth control. You re free to have sex without the fear of unwanted pregnancy. However, a vasectomy does not protect you from sexually transmitted diseases (STDs). If you have more than one sex partner, be sure to practice safer sex by using condoms.    Thank you,   Your Urology Care Team

## 2023-08-17 NOTE — TELEPHONE ENCOUNTER
Mychart is to be delayed 9/4/23.     Letter will be sent one week in advance to vasectomy procedure.     China Oakley LPN on 8/17/2023 at 4:20 PM

## 2023-09-19 ENCOUNTER — OFFICE VISIT (OUTPATIENT)
Dept: UROLOGY | Facility: CLINIC | Age: 38
End: 2023-09-19
Payer: COMMERCIAL

## 2023-09-19 VITALS — DIASTOLIC BLOOD PRESSURE: 89 MMHG | HEART RATE: 93 BPM | SYSTOLIC BLOOD PRESSURE: 137 MMHG

## 2023-09-19 DIAGNOSIS — Z30.2 ENCOUNTER FOR STERILIZATION: Primary | ICD-10-CM

## 2023-09-19 PROCEDURE — 55250 REMOVAL OF SPERM DUCT(S): CPT | Performed by: UROLOGY

## 2023-09-19 RX ORDER — AZELAIC ACID 0.15 G/G
GEL TOPICAL
COMMUNITY
Start: 2023-09-07 | End: 2024-08-16

## 2023-09-19 RX ORDER — DOXYCYCLINE 100 MG/1
CAPSULE ORAL
COMMUNITY
Start: 2023-09-07 | End: 2024-08-16

## 2023-09-19 NOTE — PROGRESS NOTES
OFFICE VASECTOMY OPERATIVE NOTE  LISA GIMENEZ     DATE: 09/19/23  PATIENT: Kiran Arroyo    YOB: 1985    Kiran Arroyo is a 38 year old male.  He has 4 children and he wishes a vasectomy for birth control.  He has read the brochure and he has shaved himself.  I reviewed the vasectomy procedure with him explaining that it would be done with a local anesthetic given just in the location where the vasectomy would be done.  It would be done through incisions with the removal of segments of the vasa, cauterization of the ends, and burying the ends separate with sutures.      Pt. Understands:  -1/1000-1/3000 risk of future pregnancy even with perfectly done vasectomy  -vasectomy is a permanent procedure    -he may cryopreserve sperm if he wishes   -1-5% risk of post-vasectomy pain syndrome   -1-5% risk of complication, primarily infection or bleeding  - he needs to have a semen sample that shows no sperm before getting approval for unprotected intercourse.      Complications such as bleeding, infection, and damage to other tissues in the area were discussed. I recommended that an ice bag be placed on the scrotum off and on tonight to help reduce pain and swelling.      He was reminded that he was not sterile immediately after the vasectomy that it would take at least 20 ejaculations to empty the vas of any remaining sperm.  He was not to provide a semen sample until after the 20th ejaculation and not before 12 weeks after the vas. He was  to fulfill both of those requirements.   He understands it is his responsibility to find out the results of the vas before proceeding with intercourse without birth control protection.  Other items discussed were activity afterwards, returning to work, voluntary physical activity,  resuming sexual activity, clothing to wear, bathing, and care of the vas site and expected changes in the site as healing progresses.  After signing the permit, bilateral vasectomy was done  as described below.     ANESTHESIA: Local    DETAILS OF PROCEDURE: The risks of the procedure were explained in detail to the patient and informed consent was obtained. The patient was placed supine on the procedure table and the penis and scrotum were prepped and draped in the standard sterile fashion. The right vas deferens was isolated and brought up to the skin. 1% lidocaine local anesthesia was used to infiltrate the skin and the spermatic cord. A small incision was created and adventitial tissues were swept away from the vas. A 1 cm segment of the vas was excised and sent for pathology. The proximal and distal lumina of the vas were cauterized and then each segment was tied off in a knuckling-fashion with a 3-0 vicryl suture. Hemostasis was ensured and the segments were released back into the scrotum. Meticulous hemostasis was achieved. The skin was closed with 3-0 chromic suture in a horizontal mattress fashion. Next the left vas was brought to the skin and a vasectomy was performed in the similar fashion. The skin was closed with 3-0 chromic suture in a horizontal mattress fashion.    COMPLICATIONS: None    TAKE HOME MEDICATIONS: Tylenol every 6 hours, PRN    DISMISSAL INSTRUCTIONS:  - Ice pack to scrotum 15 to 20 minutes each hour awake for 36 to 40 hours.  - No strenuous activity or ejaculation for at least 7 days.  - No unprotected sexual activity until proven azoospermia on semen samples at 3 months.  - Referred to patient handout for normal postop expectations and indications to contact nurse or physician.    M.D.: Terry House MD

## 2023-09-19 NOTE — NURSING NOTE
Kiran Arroyo's goals for this visit include:   Chief Complaint   Patient presents with    Vasectomy     Voluntary sterilization        He requests these members of his care team be copied on today's visit information:       PCP: Eddy Butt    Referring Provider:  No referring provider defined for this encounter.    /89 (BP Location: Right arm, Patient Position: Chair)   Pulse 93     Do you need any medication refills at today's visit?     China Oakley LPN on 9/19/2023 at 3:16 PM

## 2023-09-19 NOTE — PATIENT INSTRUCTIONS
Vasectomy Post-Op Care Instructions     You may go home after the procedure is completed. There may be some pain in your groin for 3 or 4 days after the operation. Some blood or yellow liquid may ooze from the cuts on the outside. The area around the cuts may swell and bruise. The sutures will dissolve on their own and do not require removal by the physician.    The first 48 hours after the procedure are crucial to healing. Generally, you may feel very good the day after the procedure, but that does not mean it is time to go back to normal activities. Resuming normal activities too soon is likely to cause internal bleeding and lots of pain.      Your provider may advise the following ways to care for yourself after the procedure:    Put an ice bag or package of frozen peas covered by a thin towel over the scrotum after the procedure. Leave the ice on the area for 30 minutes and then take it off for 30 minutes. Do this off and on for at least 24 hours.      Avoid all heavy lifting (greater than 10 pounds) for at least one week.    Wear a jockstrap or tight-fitting underwear for approximately one week to support the scrotum (testicles) and help reduce discomfort.    Take a pain reliever, such as Acetaminophen (Tylenol) or Ibuprofen (Advil), for any pain after the procedure. Your provider may prescribe a stronger pain medicine if it is needed.    You should be able to return to work in 48 hours, but strenuous activity should be avoided for two weeks.    Do not submerge the incision for 1 week following the procedure.    Ejaculation should be avoided for one week to allow the area to heal.    Follow-Up    You will need to have a negative post vasectomy analyses (no sperm seen) before you can discontinue birth control.    Semen Analysis   Schedule the post-vasectomy semen analysis three months after your vasectomy. You will need to ejaculate at least 20 times between your surgery and the first sample. Clinic staff will  contact your regarding your results via phone.    You will be given a container after the procedure. Collect the specimen at home by masturbation only (no lubrication, powders, saliva, or intercourse can be used) and bring it to the laboratory. You must have an appointment to drop off your specimen. The specimen needs to be delivered to the lab within 30-45 minutes.    Call [phone] with questions. For concerns or questions after hours or on weekends, please page the Urology Resident on-call: [phone].

## 2023-09-21 ENCOUNTER — MYC MEDICAL ADVICE (OUTPATIENT)
Dept: UROLOGY | Facility: CLINIC | Age: 38
End: 2023-09-21
Payer: COMMERCIAL

## 2023-10-18 ENCOUNTER — MYC MEDICAL ADVICE (OUTPATIENT)
Dept: UROLOGY | Facility: CLINIC | Age: 38
End: 2023-10-18
Payer: COMMERCIAL

## 2023-11-05 NOTE — LETTER
Letter by Rosario Harden MD at      Author: Rosario Harden MD Service: -- Author Type: --    Filed:  Encounter Date: 1/7/2020 Status: Signed         January 7, 2020     Onur Cortez PA-C  [address] [place] [address]    Patient: Kiran Arroyo   MR Number: [id number]   YOB: 1985   Date of Visit: 1/7/2020     Dear Dr. Dana PA-C:    Thank you for referring Kiran Arroyo to me for evaluation.  Allergy testing was negative.  I suspect this was likely viral in origin.  I have included my note for your review.    If you have questions, please do not hesitate to call me.     Sincerely,        Rosario Harden MD        CC  No Recipients    Progress Notes:Chief complaint: Hives    History of present illness: This is a pleasant 34-year-old gentleman that I was asked to see by Onur Cortez in regards to hives.  He states a few weeks ago he was working at [employer].  He works there as an  overnights.  He got off work around 6 AM.  He states he was trying to hurry and collect his stools.  He is sweating.  It was cold outside and then he went into his car and turned on the warm heat.  He then noted some itching on his head.  He went home and took a shower.  He is been told in the past that he has keratosis pilaris and states that when he rubs up against things sometimes this is aggravated and elevates.  He states that typically the cold helps this.  He took a shower and the itching worsened.  He went to bed and then started to develop hives.  His wife gave him 2 Benadryl.  He had a slight tickle in his throat and some drainage.  He could talk, however.  No wheezing but felt that he was slightly short of breath.  He went to the emergency room exam was normal except for urticaria.  His eyes were swollen face was swollen on the way there but did improve with the Benadryl.  He was given steroids and watched for a period of time and symptoms did improve.   "He had hives come and go up to about 3 days after discharge.  He states that night he can think of nothing else unusual that he would have done.  He did not take any over-the-counter medications.  He did have some chili around midnight.  He was not sick at the time and denies any previous episodes of hives.  He does note that sometimes he has a rash that appears in the flexor surfaces of his elbows but he did not have this at the time.  He denies any nasal congestion or drainage at that time.    Past medical history: Otherwise unremarkable    Social history: He is an , has a rabbit at home but had no interaction with the rabbit before the hives appeared, non-smoking environment, non-smoker, no exposure to mold at home    Family history: Negative for urticaria    Review of Systems performed as above and the remainder is negative.         Current Outpatient Medications:   ?  EPINEPHrine (EPIPEN/ADRENACLICK/AUVI-Q) 0.3 mg/0.3 mL injection, Inject 0.3 mL (0.3 mg total) as directed as needed for anaphylaxis. Inject into thigh., Disp: 2 Pre-filled Pen Syringe, Rfl: 0  ?  ibuprofen (ADVIL,MOTRIN) 800 MG tablet, Take 800 mg by mouth., Disp: , Rfl:   ?  triamcinolone (KENALOG) 0.1 % cream, Apply topically 2 (two) times a day. Lightly to affected area, Disp: 45 g, Rfl: 1    No Known Allergies    /74   Pulse 98   Ht 6' 3\" (1.905 m)   Wt (!) 292 lb (132.5 kg)   BMI 36.50 kg/m     Gen: Pleasant male not in acute distress  HEENT: Eyes no erythema of the bulbar or palpebral conjunctiva, no edema. Ears: TMs well visualized, no effusions. Nose: No congestion, mucosa normal. Mouth: Throat clear, no lip or tongue edema.   Cardiac: Regular rate and rhythm, no murmurs, rubs or gallops  Respiratory: Clear to auscultation bilaterally, no adventitious breath sounds  Lymph: No supraclavicular or cervical lymphadenopathy  Skin: Dermatographia  Psych: Alert and oriented times 3    Last Percutaneous Allergy Test " Results  Trees  Gaurav, White  1:20 H  (W/F in mm): 0 (01/07/20 1453)  Birch Mix 1:20 H (W/F in mm): 0 (01/07/20 1453)  Sequatchie, Common 1:20 H (W/F in mm): 0 (01/07/20 1453)  Elm, American 1:20 H (W/F in mm): 0 (01/07/20 1453)  Madera, Shagbark 1:20 H (W/F in mm): 0 (01/07/20 1453)  Maple, Hard/Sugar 1:20 H (W/F in mm): 0 (01/07/20 1453)  Clinton Mix 1:20 H (W/F in mm): 0 (01/07/20 1453)  Napoleon, Red 1:20 H (W/F in mm): 0 (01/07/20 1453)  Valparaiso, American 1:20 H (W/F in mm): 0 (01/07/20 1453)  Trezevant Tree 1:20 H (W/F in mm): 0 (01/07/20 1453)  Dust Mites  D. Pteronyssinus Mite 30,000 AU/ML H (W/F in mm): 0 (01/07/20 1453)  D. Farinae Mite 30,000 AU/ML H (W/F in mm: 0 (01/07/20 1453)  Grasses  Grass Mix #4 10,000 BAU/ML H: 0 (01/07/20 1453)  Aleksander Grass 1:20 H (W/F in mm): 0 (01/07/20 1453)  Cockroach  Cockroach Mix 1:10 H (W/F in mm): 0 (01/07/20 1453)  Molds/Fungi  Alternaria Tenuis 1:10 H (W/F in mm): 0 (01/07/20 1453)  Aspergillus Fumigatus 1:10 H (W/F in mm): 0 (01/07/20 1453)  Homodendrum Cladosporioides 1:10 H (W/F in mm): 0 (01/07/20 1453)  Penicillin Notatum 1:10 H (W/F in mm): 0 (01/07/20 1453)  Epicoccum 1:10 H (W/F in mm): 0 (01/07/20 1453)  Weeds  Ragweed, Short 1:20 H (W/F in mm): 0 (01/07/20 1453)  Dock, Weatherby 1:20 H (W/F in mm): 0 (01/07/20 1453)  Lamb's Quarter 1:20 H (W/F in mm): 0 (01/07/20 1453)  Pigweed, Rough Red Root 1:20 H  (W/F in mm): 0 (01/07/20 1453)  Plantain, English 1:20 H  (W/F in mm): 0 (01/07/20 1453)  Sagebrush, Mugwort 1:20 H  (W/F in mm): 0 (01/07/20 1453)  Animal  Cat 10,000 BAU/ML H (W/F in mm): 0 (01/07/20 1453)  Dog 1:10 H (W/F in mm): 0 (01/07/20 1453)  Controls  Device Type: QUINTIP (01/07/20 1453)  Neg. control: 50% Glycerine/Saline H (W/F in mm): 0/0 (01/07/20 1453)  Pos. control: Histamine 6mg/ML (W/F in mms): 12/21 (01/07/20 1453)      Impression report and plan:  1.  Acute urticaria    This episode of urticaria appears to be unexplained.  Likely viral in origin,  however, this is a diagnosis of exclusion.  I went over triggers for urticaria.  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  Explained this to him.  Okay to use cetirizine as needed.  Hives can persist up to 6 weeks after the initial event.  If hives persist beyond 6 weeks, he needs to let me know.  No further work-up at this time.  He should record all of the events of the previous 2 to 6 hours if the hives do recur.  Follow as needed.            full/upper/lower

## 2024-04-18 ENCOUNTER — OFFICE VISIT (OUTPATIENT)
Dept: FAMILY MEDICINE | Facility: CLINIC | Age: 39
End: 2024-04-18
Payer: COMMERCIAL

## 2024-04-18 VITALS
RESPIRATION RATE: 16 BRPM | BODY MASS INDEX: 38.54 KG/M2 | DIASTOLIC BLOOD PRESSURE: 88 MMHG | OXYGEN SATURATION: 97 % | SYSTOLIC BLOOD PRESSURE: 122 MMHG | TEMPERATURE: 97.5 F | HEART RATE: 87 BPM | HEIGHT: 75 IN | WEIGHT: 310 LBS

## 2024-04-18 DIAGNOSIS — M62.08 DIASTASIS RECTI: ICD-10-CM

## 2024-04-18 DIAGNOSIS — K42.9 UMBILICAL HERNIA WITHOUT OBSTRUCTION AND WITHOUT GANGRENE: Primary | ICD-10-CM

## 2024-04-18 DIAGNOSIS — K92.1 HEMATOCHEZIA: ICD-10-CM

## 2024-04-18 PROCEDURE — G2211 COMPLEX E/M VISIT ADD ON: HCPCS | Performed by: FAMILY MEDICINE

## 2024-04-18 PROCEDURE — 99214 OFFICE O/P EST MOD 30 MIN: CPT | Performed by: FAMILY MEDICINE

## 2024-04-18 RX ORDER — ISOTRETINOIN 40 MG/1
CAPSULE, LIQUID FILLED ORAL
COMMUNITY
Start: 2024-04-17

## 2024-04-18 SDOH — HEALTH STABILITY: PHYSICAL HEALTH: ON AVERAGE, HOW MANY MINUTES DO YOU ENGAGE IN EXERCISE AT THIS LEVEL?: 20 MIN

## 2024-04-18 SDOH — HEALTH STABILITY: PHYSICAL HEALTH: ON AVERAGE, HOW MANY DAYS PER WEEK DO YOU ENGAGE IN MODERATE TO STRENUOUS EXERCISE (LIKE A BRISK WALK)?: 4 DAYS

## 2024-04-18 ASSESSMENT — PAIN SCALES - GENERAL: PAINLEVEL: NO PAIN (0)

## 2024-04-18 ASSESSMENT — SOCIAL DETERMINANTS OF HEALTH (SDOH): HOW OFTEN DO YOU GET TOGETHER WITH FRIENDS OR RELATIVES?: ONCE A WEEK

## 2024-04-18 NOTE — COMMUNITY RESOURCES LIST (ENGLISH)
April 18, 2024           YOUR PERSONALIZED LIST OF SERVICES & PROGRAMS           & SHELTER    Case Management      [facility] - Housing search assistance  [address] [place] [address] (Distance: 11.7 miles)  Phone: [phone]  Language: English  Fee: Free  Accessibility: Ada accessible, Translation services      [facility] - Housing Stabilization Services (HSS)  Phone: [phone]  Website: https://www.Karmarama.Nimbit/resources  Language: English, Khmer  Hours: Mon 8:00 AM - 4:00 PM Tue 8:00 AM - 4:00 PM Wed 8:00 AM - 4:00 PM Thu 8:00 AM - 4:00 PM Fri 8:00 AM - 4:00 PM  Fee: Free, Insurance  Accessibility: Ada accessible, Blind accommodation, Deaf or hard of hearing, Translation services      [employer] - Housing Stabilization Services  Phone: [phone]  Website: https://homebasemn.com/  Language: English  Hours: Mon 8:00 AM - 4:00 PM Tue 8:00 AM - 4:00 PM Wed 8:00 AM - 4:00 PM Thu 8:00 AM - 4:00 PM Fri 8:00 AM - 4:00 PM  Fee: Free  Accessibility: Blind accommodation, Deaf or hard of hearing  Transportation Options: Free transportation    Payment Assistance      [facility] [place] ([facility]) - Emergency Assistance - Rent and mortgage payment assistance  [address] [place] [address] (Distance: 5.0 miles)  Phone: [phone]  Website: http://www.caer2threadsodsMassive Damagef.org  Language: English, Khmer  Fee: Free  Accessibility: Deaf or hard of hearing, Blind accommodation, Translation services      [facility] - Emergency Assistance - Rent and mortgage payment assistance  [address] [place] [address] (Distance: 3.3 miles)  Phone: [phone]  Language: English  Fee: Free  Accessibility: Ada accessible, Translation services      [employer] - Keep the Key  Phone: [phone]  Website: https://www.wla06-plwnxfwvjrxzax.org/programs.html  Language: English  Hours: Mon 7:00 AM - 7:00  PM Tue 7:00 AM - 7:00 PM Wed 7:00 AM - 7:00 PM Thu 7:00 AM - 7:00 PM Fri 7:00 AM - 7:00 PM  Fee: Free    Mediation & Eviction Prevention      [facility] Housing Stability  [address] [place] [address] (Distance: 20.6 miles)  Phone: [phone]  Website: https://wwwMingyian  Language: English  Fee: Free  Accessibility: Translation services      [facility] - Tenant Rights / Eviction Prevention  Website: https://Fotolia.Apparity/j-fxjh-hs-/  Language: English, Canadian      [facility] - Housing Stabilization Services  Phone: [phone]  Website: https://Pinnacle [employer] Solutions/HousingOpiatalkStabilization.html  Language: English  Hours: Mon 9:00 AM - 5:00 PM Tue 9:00 AM - 5:00 PM Wed 9:00 AM - 5:00 PM Thu 9:00 AM - 5:00 PM Fri 9:00 AM - 5:00 PM  Fee: Insurance  Accessibility: Deaf or hard of hearing, Translation services               IMPORTANT NUMBERS & WEBSITES        Emergency Services  911  .   United Way  211 http://211unitedway.org  .   Poison Control  (200) 654-8267 http://mnpoison.org http://wisconsinpoison.org  .     Suicide and Crisis Lifeline  988 http://988lifeline.org  .   Childhelp National Child Abuse Hotline  127.949.5776 http://Childhelphotline.org   .   National Sexual Assault Hotline  (582) 559-5122 (HOPE) http://Rainn.org   .     National Runaway Safeline  (820) 912-5869 (RUNAWAY) http://1800runaFielding Systems.org  .   Pregnancy & Postpartum Support  Call/text 294-239-0784  [place]: http://ppsupportmn.org  [place]: http://Memorado.com/wi  .   Substance Abuse National Helpline ([facility])  359-926-HELP (4372) http://Findtreatment.gov   .                DISCLAIMER: These resources have been generated via the [employer] Platform. [employer] does not endorse any service providers mentioned in this resource list. [employer] does not guarantee that the services mentioned in this resource list will be available to you or will improve your health or wellness.    [facility]

## 2024-04-18 NOTE — COMMUNITY RESOURCES LIST (ENGLISH)
April 18, 2024           YOUR PERSONALIZED LIST OF SERVICES & PROGRAMS           & SHELTER    Case Management      [facility] - Housing search assistance  [address] [place] [address] (Distance: 11.7 miles)  Phone: [phone]  Language: English  Fee: Free  Accessibility: Ada accessible, Translation services      [facility] - Housing Stabilization Services (HSS)  Phone: [phone]  Website: https://www.CoreTrace.Brille24/resources  Language: English, Bengali  Hours: Mon 8:00 AM - 4:00 PM Tue 8:00 AM - 4:00 PM Wed 8:00 AM - 4:00 PM Thu 8:00 AM - 4:00 PM Fri 8:00 AM - 4:00 PM  Fee: Free, Insurance  Accessibility: Ada accessible, Blind accommodation, Deaf or hard of hearing, Translation services      [employer] - Housing Stabilization Services  Phone: [phone]  Website: https://homebasemn.com/  Language: English  Hours: Mon 8:00 AM - 4:00 PM Tue 8:00 AM - 4:00 PM Wed 8:00 AM - 4:00 PM Thu 8:00 AM - 4:00 PM Fri 8:00 AM - 4:00 PM  Fee: Free  Accessibility: Blind accommodation, Deaf or hard of hearing  Transportation Options: Free transportation    Payment Assistance      [facility] [place] ([facility]) - Emergency Assistance - Rent and mortgage payment assistance  [address] [place] [address] (Distance: 5.0 miles)  Phone: [phone]  Website: http://www.caerSwrveodsArrayitf.org  Language: English, Bengali  Fee: Free  Accessibility: Deaf or hard of hearing, Blind accommodation, Translation services      [facility] - Emergency Assistance - Rent and mortgage payment assistance  [address] [place] [address] (Distance: 3.3 miles)  Phone: [phone]  Language: English  Fee: Free  Accessibility: Ada accessible, Translation services      [employer] - Keep the [facility]  Phone: ([phone] [phone]  Website: https://www.xhw95-yvlbjeuqxvybju.org/programs.html  Language: English  Hours: Mon 7:00 AM - 7:00  PM Tue 7:00 AM - 7:00 PM Wed 7:00 AM - 7:00 PM Thu 7:00 AM - 7:00 PM Fri 7:00 AM - 7:00 PM  Fee: Free    Mediation & Eviction Prevention      [facility] Housing Stability  [address] [place] [address] (Distance: 20.6 miles)  Phone: [phone]  Website: https://wwwKontron  Language: English  Fee: Free  Accessibility: Translation services      [facility] - Tenant Rights / Eviction Prevention  Website: https://Janalakshmi.CrowdComfort/k-axpe-wf-/  Language: English, Japanese      [facility] - Housing Stabilization Services  Phone: [phone]  Website: https://Primcogent Solutions/HousingCinchcastStabilization.html  Language: English  Hours: Mon 9:00 AM - 5:00 PM Tue 9:00 AM - 5:00 PM Wed 9:00 AM - 5:00 PM Thu 9:00 AM - 5:00 PM Fri 9:00 AM - 5:00 PM  Fee: Insurance  Accessibility: Deaf or hard of hearing, Translation services               IMPORTANT NUMBERS & WEBSITES        Emergency Services  911  .   United Way  211 http://211unitedway.org  .   Poison Control  (790) 371-5198 http://mnpoison.org http://wisconsinpoison.org  .     Suicide and Crisis Lifeline  988 http://988lifeline.org  .   Childhelp National Child Abuse Hotline  877.512.4037 http://Childhelphotline.org   .   National Sexual Assault Hotline  (690) 734-4322 (HOPE) http://Rainn.org   .     National Runaway Safeline  (536) 919-8924 (RUNAWAY) http://1800runaEnvironmental Support Solutions.org  .   Pregnancy & Postpartum Support  Call/text 751-826-2867  [place]: http://ppsupportmn.org  [place]: http://drchrono.com/wi  .   Substance Abuse National Helpline ([facility])  913-475-HELP (0197) http://Findtreatment.gov   .                DISCLAIMER: These resources have been generated via the [employer] Dec[employer] Platform. [employer] does not endorse any service providers mentioned in this resource list. [employer] does not guarantee that the services mentioned in this resource list will be available to you or will improve your health or wellness.    [facility]

## 2024-04-18 NOTE — COMMUNITY RESOURCES LIST (ENGLISH)
April 18, 2024           YOUR PERSONALIZED LIST OF SERVICES & PROGRAMS           & SHELTER    Case Management      [facility] - Housing search assistance  [address] [place] [address] (Distance: 11.7 miles)  Phone: [phone]  Language: English  Fee: Free  Accessibility: Ada accessible, Translation services      [facility] - Housing Stabilization Services (HSS)  Phone: [phone]  Website: https://www.Prescribe Wellness.Novogy/resources  Language: English, Serbian  Hours: Mon 8:00 AM - 4:00 PM Tue 8:00 AM - 4:00 PM Wed 8:00 AM - 4:00 PM Thu 8:00 AM - 4:00 PM Fri 8:00 AM - 4:00 PM  Fee: Free, Insurance  Accessibility: Ada accessible, Blind accommodation, Deaf or hard of hearing, Translation services      [employer] - Housing Stabilization Services  Phone: [phone]  Website: https://homebasemn.com/  Language: English  Hours: Mon 8:00 AM - 4:00 PM Tue 8:00 AM - 4:00 PM Wed 8:00 AM - 4:00 PM Thu 8:00 AM - 4:00 PM Fri 8:00 AM - 4:00 PM  Fee: Free  Accessibility: Blind accommodation, Deaf or hard of hearing  Transportation Options: Free transportation    Payment Assistance      [facility] [place] ([facility]) - Emergency Assistance - Rent and mortgage payment assistance  [address] [place] [address] (Distance: 5.0 miles)  Phone: [phone]  Website: http://www.caerIn FlowodsCREOpointf.org  Language: English, Serbian  Fee: Free  Accessibility: Deaf or hard of hearing, Blind accommodation, Translation services      [facility] - Emergency Assistance - Rent and mortgage payment assistance  [address] [place] [address] (Distance: 3.3 miles)  Phone: [phone]  Language: English  Fee: Free  Accessibility: Ada accessible, Translation services      [employer] - Keep the Key  Phone: (547) 173-1964  Website: https://www.vzi70-flrgkfoxggkgog.org/programs.html  Language: English  Hours: Mon 7:00 AM - 7:00  PM Tue 7:00 AM - 7:00 PM Wed 7:00 AM - 7:00 PM Thu 7:00 AM - 7:00 PM Fri 7:00 AM - 7:00 PM  Fee: Free    Mediation & Eviction Prevention      [facility] Housing Stability  [address] [place] [address] (Distance: 20.6 miles)  Phone: [phone]  Website: https://wwwretsCloud  Language: English  Fee: Free  Accessibility: Translation services      [facility] - Tenant Rights / Eviction Prevention  Website: https://Tango Card.Reframe It/e-iyoa-wi-/  Language: English, Tuvaluan      [facility] - Housing Stabilization Services  Phone: (749) [phone]  Website: https://CityFashion for Business/HousingKiwii CapitalStabilization.html  Language: English  Hours: Mon 9:00 AM - 5:00 PM Tue 9:00 AM - 5:00 PM Wed 9:00 AM - 5:00 PM Thu 9:00 AM - 5:00 PM Fri 9:00 AM - 5:00 PM  Fee: Insurance  Accessibility: Deaf or hard of hearing, Translation services               IMPORTANT NUMBERS & WEBSITES        Emergency Services  911  .   United Way  211 http://211unitedway.org  .   Poison Control  (104) 902-7076 http://mnpoison.org http://wisconsinpoison.org  .     Suicide and Crisis Lifeline  988 http://988lifeline.org  .   Childhelp National Child Abuse Hotline  765.854.6808 http://Childhelphotline.org   .   National Sexual Assault Hotline  (154) 180-5473 (HOPE) http://Rainn.org   .     National Runaway Safeline  (434) 539-5807 (RUNAWAY) http://1800runaRollerscoot.org  .   Pregnancy & Postpartum Support  Call/text 362-599-5102  [place]: http://ppsupportmn.org  [place]: http://Science Behind Sweat.com/wi  .   Substance Abuse National Helpline ([facility])  141-245-HELP (4398) http://Findtreatment.gov   .                DISCLAIMER: These resources have been generated via the Startup Freak Platform. Startup Freak does not endorse any service providers mentioned in this resource list. [employer] does not guarantee that the services mentioned in this resource list will be available to you or will improve your health or wellness.    [facility]

## 2024-04-18 NOTE — PROGRESS NOTES
"  Assessment & Plan   1. Umbilical hernia without obstruction and without gangrene  Exam consistent with both umbilical hernia and diastases recti.  Referral given for hernia.  Some tenderness.  - Adult General Surg Referral; Future    2. Diastasis recti  Reassured patient.    3. Hematochezia  Likely due to internal hemorrhoids; however, given abdominal discomfort and persistence will recommend further evaluation with colonoscopy.  Referral given.  - Adult GI  Referral - Procedure Only; Future        Eddy Butt MD  [facility]    Disclaimer: This note consists of symbols derived from keyboarding, dictation and/or voice recognition software. As a result, there may be errors in the script that have gone undetected. Please consider this when interpreting information found in this chart.    Juliano Beck is a 38 year old, presenting for the following health issues:  Physical      4/18/2024     8:10 AM   Additional Questions   Roomed by YK   Accompanied by self     HPI     Patient has concerns of lump on upper abdomen. Wife noticed it last night and he has noticed it for a while. Sensitive to touch. When laying on back and tightens abdominal muscles, muscles tighten, but \"inflammed\" down the center.     He has also noticed blood in the stools.  No melena.  Last was 2 weeks ago.  Usually occurs with larger base stools.      Review of Systems  Constitutional, HEENT, cardiovascular, pulmonary, GI, , musculoskeletal, neuro, skin, endocrine and psych systems are negative, except as otherwise noted.      Objective    /88   Pulse 87   Temp 97.5  F (36.4  C) (Temporal)   Resp 16   Ht 1.91 m (6' 3.2\")   Wt 140.6 kg (310 lb)   SpO2 97%   BMI 38.54 kg/m    Body mass index is 38.54 kg/m .  Physical Exam  Abdominal:      Hernia: A hernia is present. Hernia is present in the umbilical area.      Comments: Diastasis Recti   Genitourinary:     Rectum: No anal " fissure or external hemorrhoid.        Labs: None        Signed Electronically by: Eddy Butt MD

## 2024-04-18 NOTE — PROGRESS NOTES
Preventive Care Visit  [facility] JONG Butt MD, Family Medicine  Apr 18, 2024  {Provider  Link to [facility] :794034}    {PROVIDER CHARTING PREFERENCE:243716}    Juliano Beck is a 38 year old, presenting for the following:  Physical        4/18/2024     8:10 AM   Additional Questions   Roomed by YK   Accompanied by self        Health Care Directive  Patient does not have a Health Care Directive or Living Will: {ADVANCE_DIRECTIVE_STATUS:205836}    HPI  Possible hernia on abdomen    {SUPERLIST (Optional):556985}  {additonal problems for provider to add (Optional):393820}      4/18/2024   General Health   How would you rate your overall physical health? (!) FAIR   Feel stress (tense, anxious, or unable to sleep) To some extent   (!) STRESS CONCERN      4/18/2024   Nutrition   Three or more servings of calcium each day? Yes   Diet: Regular (no restrictions)   How many servings of fruit and vegetables per day? (!) 2-3   How many sweetened beverages each day? (!) 2         4/18/2024   Exercise   Days per week of moderate/strenous exercise 4 days   Average minutes spent exercising at this level 20 min         4/18/2024   Social Factors   Frequency of gathering with friends or relatives Once a week   Worry food won't last until get money to buy more No   Food not last or not have enough money for food? No   Do you have housing?  Yes   Are you worried about losing your housing? Yes   Lack of transportation? No   Unable to get utilities (heat,electricity)? No   Want help with housing or utility concern? No   (!) HOUSING CONCERN PRESENT      4/18/2024   Dental   Dentist two times every year? Yes         4/18/2024   TB Screening   Were you born outside of the [place]? No         Today's PHQ-2 Score:       4/18/2024     8:07 AM   PHQ-2 ( 1999 [employer])   Q1: Little interest or pleasure in doing things 1   Q2: Feeling down, depressed or hopeless 1   PHQ-2 Score 2   Q1: Little interest or  "pleasure in doing things Several days   Q2: Feeling down, depressed or hopeless Several days   PHQ-2 Score 2           4/18/2024   Substance Use   Alcohol more than 3/day or more than 7/wk Not Applicable   Do you use any other substances recreationally? No     Social History     Tobacco Use    Smoking status: Never    Smokeless tobacco: Never   Vaping Use    Vaping status: Never Used   Substance Use Topics    Alcohol use: No    Drug use: No     {Provider  If there are gaps in the social history shown above, please follow the link to update and then refresh the note Link to Social and Substance History :048169}      4/18/2024   STI Screening   New sexual partner(s) since last STI/HIV test? No     {Provider  Use the storyboard to review patient history, after sections have been marked as reviewed, refresh note to capture documentation:207856}   Reviewed and updated as needed this visit by Provider                    {HISTORY OPTIONS (Optional):164131}    {ROS Picklists (Optional):166986}     Objective    Exam  There were no vitals taken for this visit.   Estimated body mass index is 36.94 kg/m  as calculated from the following:    Height as of 4/19/23: 1.918 m (6' 3.5\").    Weight as of 4/19/23: 135.9 kg (299 lb 8 oz).    Physical Exam  {Exam Choices (Optional):093543}        Signed Electronically by: Eddy Butt MD  {Email feedback regarding this note to [email or web address]   :930593}  "

## 2024-05-15 ENCOUNTER — TELEPHONE (OUTPATIENT)
Dept: GASTROENTEROLOGY | Facility: CLINIC | Age: 39
End: 2024-05-15
Payer: COMMERCIAL

## 2024-05-15 NOTE — TELEPHONE ENCOUNTER
"Endoscopy Scheduling Screen    Have you had a positive Covid test in the last 14 days?  No    What is your communication preference for Instructions and/or Bowel Prep?   MyChart    What insurance is in the chart?  Other:  [facility]    Ordering/Referring Provider:   YAMILKA TAYLOR        (If ordering provider performs procedure, schedule with ordering provider unless otherwise instructed. )    BMI: Estimated body mass index is 38.54 kg/m  as calculated from the following:    Height as of 4/18/24: 1.91 m (6' 3.2\").    Weight as of 4/18/24: 140.6 kg (310 lb).     Sedation Ordered  moderate sedation.   If patient BMI > 50 do not schedule in [facility].    If patient BMI > 45 do not schedule at [facility].    Are you taking methadone or Suboxone?  No    Have you had difficulties, pain, or discomfort during past endoscopy procedures?  No    Are you taking any prescription medications for pain 3 or more times per week?   NO, No RN review required.    Do you have a history of malignant hyperthermia?  No    (Females) Are you currently pregnant?   No     Have you been diagnosed or told you have pulmonary hypertension?   No    Do you have an LVAD?  No    Have you been told you have moderate to severe sleep apnea?  No    Have you been told you have COPD, asthma, or any other lung disease?  No    Do you have any heart conditions?  No     Have you ever had or are you waiting for an organ transplant?  No. Continue scheduling, no site restrictions.    Have you had a stroke or transient ischemic attack (TIA aka \"mini stroke\" in the last 6 months?   No    Have you been diagnosed with or been told you have cirrhosis of the liver?   No    Are you currently on dialysis?   No    Do you need assistance transferring?   No    BMI: Estimated body mass index is 38.54 kg/m  as calculated from the following:    Height as of 4/18/24: 1.91 m (6' 3.2\").    Weight as of 4/18/24: 140.6 kg (310 lb).     Is patients BMI > 40 and scheduling location [facility]?  No    "

## 2024-06-26 ENCOUNTER — OFFICE VISIT (OUTPATIENT)
Dept: SURGERY | Facility: CLINIC | Age: 39
End: 2024-06-26
Attending: FAMILY MEDICINE
Payer: COMMERCIAL

## 2024-06-26 ENCOUNTER — TELEPHONE (OUTPATIENT)
Dept: SURGERY | Facility: CLINIC | Age: 39
End: 2024-06-26

## 2024-06-26 VITALS — HEART RATE: 87 BPM | DIASTOLIC BLOOD PRESSURE: 88 MMHG | SYSTOLIC BLOOD PRESSURE: 122 MMHG

## 2024-06-26 DIAGNOSIS — K42.9 UMBILICAL HERNIA WITHOUT OBSTRUCTION AND WITHOUT GANGRENE: ICD-10-CM

## 2024-06-26 PROCEDURE — 99203 OFFICE O/P NEW LOW 30 MIN: CPT | Mod: 57 | Performed by: SURGERY

## 2024-06-26 NOTE — TELEPHONE ENCOUNTER
Type of surgery: HERNIORRHAPHY, UMBILICAL, OPEN possible mesh (N/A)   Location of surgery: [facility]  Date and time of surgery: 8/30  Surgeon: paola   Pre-Op Appt Date: 8/16  Post-Op Appt Date: 9/18   Packet sent out: Yes  Pre-cert/Authorization completed:  Not Applicable  Date:

## 2024-06-26 NOTE — PROGRESS NOTES
Patient seen in consultation for umbilical hernia by Eddy Butt    HPI:  Patient is a 39 year old male  with complaints of bulge at umbilicus  The patient noticed the symptoms about 1 year ago.   When first noticed did have some pain. Now if pushed on can feel it, mild pain  Seems to have been about the same since noticed it  Lost about 15 lbs in last month   nothing makes the episode better.  Patient has not family history of hernia problems    Review Of Systems    Skin: negative  Ears/Nose/Throat: negative  Respiratory: No shortness of breath, dyspnea on exertion, cough, or hemoptysis  Cardiovascular: negative  Gastrointestinal: negative  Genitourinary: negative  Musculoskeletal: negative  Neurologic: negative  Hematologic/Lymphatic/Immunologic: negative  Endocrine: negative      Past Medical History:   Diagnosis Date    Obesity 9/9/2016       Past Surgical History:   Procedure Laterality Date    WISDOM TOOTH EXTRACTION         Social History     Socioeconomic History    Marital status:      Spouse name: Not on file    Number of children: Not on file    Years of education: Not on file    Highest education level: Not on file   Occupational History    Not on file   Tobacco Use    Smoking status: Never    Smokeless tobacco: Never   Vaping Use    Vaping status: Never Used   Substance and Sexual Activity    Alcohol use: No    Drug use: No    Sexual activity: Yes     Partners: Female   Other Topics Concern    Not on file   Social History Narrative    Not on file     Social Determinants of Health     Financial Resource Strain: Low Risk  (4/18/2024)    Financial Resource Strain     Within the past 12 months, have you or your family members you live with been unable to get utilities (heat, electricity) when it was really needed?: No   Food Insecurity: Low Risk  (4/18/2024)    Food Insecurity     Within the past 12 months, did you worry that your food would run out before you got money to buy more?: No      Within the past 12 months, did the food you bought just not last and you didn t have money to get more?: No   Transportation Needs: Low Risk  (4/18/2024)    Transportation Needs     Within the past 12 months, has lack of transportation kept you from medical appointments, getting your medicines, non-medical meetings or appointments, work, or from getting things that you need?: No   Physical Activity: Insufficiently Active (4/18/2024)    Exercise Vital Sign     Days of Exercise per Week: 4 days     Minutes of Exercise per Session: 20 min   Stress: Stress Concern Present (4/18/2024)    [employer] [place] [employer] - Occupational Stress Questionnaire     Feeling of Stress : To some extent   Social Connections: Unknown (4/18/2024)    Social Connection and Isolation Panel [NHANES]     Frequency of Communication with Friends and Family: Not on file     Frequency of Social Gatherings with Friends and Family: Once a week     Attends Denominational Services: Not on file     Active Member of Clubs or Organizations: Not on file     Attends Club or Organization Meetings: Not on file     Marital Status: Not on file   Interpersonal Safety: Low Risk  (4/18/2024)    Interpersonal Safety     Do you feel physically and emotionally safe where you currently live?: Yes     Within the past 12 months, have you been hit, slapped, kicked or otherwise physically hurt by someone?: No     Within the past 12 months, have you been humiliated or emotionally abused in other ways by your partner or ex-partner?: No   Housing Stability: High Risk (4/18/2024)    Housing Stability     Do you have housing? : Yes     Are you worried about losing your housing?: Yes       Current Outpatient Medications   Medication Sig Dispense Refill    azelaic acid (FINACIA) 15 % external gel APPLY TOPICALLY TO AFFECTED AREA(S) OF SKIN ONCE DAILY (Patient not taking: Reported on 4/18/2024)      CLARAVIS 40 MG capsule       doxycycline hyclate (VIBRAMYCIN) 100 MG  capsule  (Patient not taking: Reported on 4/18/2024)      EPINEPHrine (ANY BX GENERIC EQUIV) 0.3 MG/0.3ML injection 2-pack Inject 0.3 mLs (0.3 mg) into the muscle as needed for anaphylaxis 2 each 1    metroNIDAZOLE (METROCREAM) 0.75 % external cream  (Patient not taking: Reported on 4/18/2024)         Medications and history reviewed    Physical exam:  Vitals: /88   Pulse 87   BMI= There is no height or weight on file to calculate BMI.    Constitutional: healthy, alert, and no distress  Head: Normocephalic. No masses, lesions, tenderness or abnormalities  Gastrointestinal: Abdomen soft, non-tender. BS normal. No masses, organomegaly, positive findings: small umbilical hernia, not reducible.  Feels to be only fatty tissue.  : Deferred  Musculoskeletal: extremities normal- no gross deformities noted, gait normal, and normal muscle tone  Skin: no suspicious lesions or rashes  Psychiatric: mentation appears normal and affect normal/bright        Assessment:     ICD-10-CM    1. Umbilical hernia without obstruction and without gangrene  K42.9 Adult General Surg Referral     Case Request: HERNIORRHAPHY, UMBILICAL, OPEN possible mesh        Plan: Risks of surgery discussed including, but not limited to bleeding, infection, recurrence, damage to intra-abdominal contents such as nerves, blood vessels, bowel or other organs.  Risks of anesthesia also discussed.  Although mesh is a better long term repair if it gets infected it must be removed. Mesh problems such as fracture, erosion or adhesions are possible.  If there is evidence of an infection at time of surgery it will be cancelled and rescheduled to when well.    Small chronically incarcerated umbilical hernia containing some fatty tissue, symptomatic.  Offered an open repair for him.  Based on clinical exam this likely would be a primary repair only but discussed that depending on defect size may want to do mesh reinforcement.  Patient questions answered.  Will  work on scheduling for him.      Roberto Gardner MD

## 2024-07-20 ENCOUNTER — HEALTH MAINTENANCE LETTER (OUTPATIENT)
Age: 39
End: 2024-07-20

## 2024-08-16 ENCOUNTER — OFFICE VISIT (OUTPATIENT)
Dept: FAMILY MEDICINE | Facility: CLINIC | Age: 39
End: 2024-08-16
Payer: COMMERCIAL

## 2024-08-16 VITALS
BODY MASS INDEX: 37.55 KG/M2 | HEART RATE: 84 BPM | OXYGEN SATURATION: 97 % | DIASTOLIC BLOOD PRESSURE: 82 MMHG | SYSTOLIC BLOOD PRESSURE: 122 MMHG | TEMPERATURE: 97.6 F | HEIGHT: 75 IN | RESPIRATION RATE: 19 BRPM | WEIGHT: 302 LBS

## 2024-08-16 DIAGNOSIS — Z01.818 PREOP GENERAL PHYSICAL EXAM: Primary | ICD-10-CM

## 2024-08-16 DIAGNOSIS — K42.9 UMBILICAL HERNIA WITHOUT OBSTRUCTION AND WITHOUT GANGRENE: ICD-10-CM

## 2024-08-16 PROCEDURE — G2211 COMPLEX E/M VISIT ADD ON: HCPCS | Performed by: FAMILY MEDICINE

## 2024-08-16 PROCEDURE — 99214 OFFICE O/P EST MOD 30 MIN: CPT | Performed by: FAMILY MEDICINE

## 2024-08-16 ASSESSMENT — PAIN SCALES - GENERAL: PAINLEVEL: NO PAIN (0)

## 2024-08-16 NOTE — PROGRESS NOTES
Preoperative Evaluation  [facility]  [address], [address]  [place] [address]  Phone: [phone]  Fax: [phone]  Primary Provider: Eddy Butt MD  Pre-op Performing Provider: Eddy Butt MD  Aug 16, 2024           8/15/2024   Surgical Information   What procedure is being done? Herniorrhaphy, umbilical.   Facility or Hospital where procedure/surgery will be performed: [facility]   Who is doing the procedure / surgery? Roberto Gardner MD   Date of surgery / procedure: 8-   Time of surgery / procedure: I dont know   Where do you plan to recover after surgery? at home with family        Fax number for surgical facility: Note does not need to be faxed, will be available electronically in Epic.    Assessment & Plan     The proposed surgical procedure is considered INTERMEDIATE risk.    1. Preop general physical exam  Medically optimized for proposed procedure.  May proceed as planned.  Up-to-date on tetanus.  Chronic conditions controlled.  Able to tolerate 4 METS.     2. Umbilical hernia without obstruction and without gangrene  Plan for procedure above.        - No identified additional risk factors other than previously addressed    Preoperative Medication Instructions  Antiplatelet or Anticoagulation Medication Instructions   - Patient is on no antiplatelet or anticoagulation medications.    Additional Medication Instructions  Take all scheduled medications on the day of surgery    Recommendation  Approval given to proceed with proposed procedure, without further diagnostic evaluation.    Eddy Butt MD  [facility]    Disclaimer: This note consists of symbols derived from keyboarding, dictation and/or voice recognition software. As a result, there may be errors in the script that have gone undetected. Please consider this when interpreting information found in this  chart.    Juliano Beck is a 39 year old, presenting for the following:  Pre-Op Exam          8/16/2024     9:28 AM   Additional Questions   Roomed by VE     HPI related to upcoming procedure: Hx of umbilical hernia. Plan for repair.        8/15/2024   Pre-Op Questionnaire   Have you ever had a heart attack or stroke? No   Have you ever had surgery on your heart or blood vessels, such as a stent placement, a coronary artery bypass, or surgery on an artery in your head, neck, heart, or legs? No   Do you have chest pain with activity? No   Do you have a history of heart failure? No   Do you currently have a cold, bronchitis or symptoms of other infection? No   Do you have a cough, shortness of breath, or wheezing? No   Do you or anyone in your family have previous history of blood clots? (!) UNKNOWN   Do you or does anyone in your family have a serious bleeding problem such as prolonged bleeding following surgeries or cuts? No   Have you ever had problems with anemia or been told to take iron pills? No   Have you had any abnormal blood loss such as black, tarry or bloody stools? No   Have you ever had a blood transfusion? No   Are you willing to have a blood transfusion if it is medically needed before, during, or after your surgery? Yes   Have you or any of your relatives ever had problems with anesthesia? (!) UNKNOWN   Do you have sleep apnea, excessive snoring or daytime drowsiness? No   Do you have any artifical heart valves or other implanted medical devices like a pacemaker, defibrillator, or continuous glucose monitor? No   Do you have artificial joints? No   Are you allergic to latex? No      Health Care Directive  Patient does not have a Health Care Directive or Living Will: Discussed advance care planning with patient; however, patient declined at this time.    Extended Emergency Contact Information  Primary Emergency Contact: Arielle Arroyo  Address: [address] [address] [place] [address]  [address] [facility]  Home Phone: [phone]  Mobile Phone: [phone]  Relation: Spouse        Preoperative Review of    reviewed - no record of controlled substances prescribed.    Status of Chronic Conditions:  See problem list for active medical problems.  Problems all longstanding and stable, except as noted/documented.  See ROS for pertinent symptoms related to these conditions.    Patient Active Problem List    Diagnosis Date Noted    Umbilical hernia without obstruction and without gangrene 06/26/2024     Priority: Medium    Allergic reaction, initial encounter 04/19/2023     Priority: Medium    Rosacea 04/19/2023     Priority: Medium    Screen for STD (sexually transmitted disease) 04/19/2023     Priority: Medium    Hepatic steatosis 03/02/2021     Priority: Medium    Dermatosis 02/11/2021     Priority: Medium    Obesity 09/09/2016     Priority: Medium    Impingement syndrome of right shoulder 02/13/2014     Priority: Medium    Sprain of right acromioclavicular joint 02/13/2014     Priority: Medium      Past Medical History:   Diagnosis Date    Obesity 9/9/2016     Past Surgical History:   Procedure Laterality Date    WISDOM TOOTH EXTRACTION       Current Outpatient Medications   Medication Sig Dispense Refill    azelaic acid (FINACIA) 15 % external gel       CLARAVIS 40 MG capsule       doxycycline hyclate (VIBRAMYCIN) 100 MG capsule       EPINEPHrine (ANY BX GENERIC EQUIV) 0.3 MG/0.3ML injection 2-pack Inject 0.3 mLs (0.3 mg) into the muscle as needed for anaphylaxis 2 each 1    metroNIDAZOLE (METROCREAM) 0.75 % external cream          No Known Allergies     Social History     Tobacco Use    Smoking status: Never     Passive exposure: Never    Smokeless tobacco: Never   Substance Use Topics    Alcohol use: No     Family History   Problem Relation Age of Onset    No Known Problems Mother     Diabetes Maternal Grandmother     Breast Cancer Maternal Grandmother     Cancer Paternal Grandmother      "Colon Cancer Paternal Grandfather      History   Drug Use No         Review of Systems  Constitutional, HEENT, cardiovascular, pulmonary, GI, , musculoskeletal, neuro, skin, endocrine and psych systems are negative, except as otherwise noted.    Objective    /82 (BP Location: Left arm, Patient Position: Chair, Cuff Size: Adult Large)   Pulse 84   Temp 97.6  F (36.4  C) (Temporal)   Resp 19   Ht 1.911 m (6' 3.25\")   Wt 137 kg (302 lb)   SpO2 97%   BMI 37.50 kg/m     Estimated body mass index is 37.5 kg/m  as calculated from the following:    Height as of this encounter: 1.911 m (6' 3.25\").    Weight as of this encounter: 137 kg (302 lb).  Physical Exam  HENT:      Head: Normocephalic and atraumatic.      Right Ear: Tympanic membrane, ear canal and external ear normal. There is no impacted cerumen.      Left Ear: Tympanic membrane, ear canal and external ear normal. There is no impacted cerumen.      Nose: Nose normal. No congestion.      Mouth/Throat:      Pharynx: Oropharynx is clear. No oropharyngeal exudate or posterior oropharyngeal erythema.   Eyes:      General:         Right eye: No discharge.         Left eye: No discharge.      Extraocular Movements: Extraocular movements intact.      Conjunctiva/sclera: Conjunctivae normal.      Pupils: Pupils are equal, round, and reactive to light.   Cardiovascular:      Rate and Rhythm: Normal rate and regular rhythm.      Heart sounds: Normal heart sounds. No murmur heard.     No friction rub.   Pulmonary:      Effort: Pulmonary effort is normal. No respiratory distress.      Breath sounds: Normal breath sounds. No wheezing or rhonchi.   Abdominal:      General: Abdomen is flat. There is no distension.      Palpations: Abdomen is soft. There is no mass.      Tenderness: There is no abdominal tenderness. There is no guarding.   Musculoskeletal:         General: No swelling.      Cervical back: Normal range of motion and neck supple. No tenderness.      " Right lower leg: No edema.      Left lower leg: No edema.   Lymphadenopathy:      Cervical: No cervical adenopathy.   Skin:     General: Skin is warm.      Findings: No rash.   Neurological:      General: No focal deficit present.      Mental Status: He is alert.      Cranial Nerves: No cranial nerve deficit.      Motor: No weakness.      Coordination: Coordination normal.      Gait: Gait normal.   Psychiatric:         Mood and Affect: Mood normal.         Behavior: Behavior normal.         Thought Content: Thought content normal.         Judgment: Judgment normal.         Diagnostics  No labs were ordered during this visit.   No EKG required, no history of coronary heart disease, significant arrhythmia, peripheral arterial disease or other structural heart disease.    Revised Cardiac Risk Index (RCRI)  The patient has the following serious cardiovascular risks for perioperative complications:   - No serious cardiac risks = 0 points     RCRI Interpretation: 0 points: Class I (very low risk - 0.4% complication rate)       Signed Electronically by: Eddy Butt MD  A copy of this evaluation report is provided to the requesting physician.

## 2024-08-16 NOTE — PATIENT INSTRUCTIONS

## 2024-08-26 ENCOUNTER — ANESTHESIA EVENT (OUTPATIENT)
Dept: SURGERY | Facility: AMBULATORY SURGERY CENTER | Age: 39
End: 2024-08-26
Payer: COMMERCIAL

## 2024-08-26 NOTE — ANESTHESIA PREPROCEDURE EVALUATION
"Anesthesia Pre-Procedure Evaluation    Patient: Kiran Arroyo   MRN: [id number] : 1985        Procedure : Procedure(s):  HERNIORRHAPHY, UMBILICAL, OPEN possible mesh          Past Medical History:   Diagnosis Date    Obesity 2016      Past Surgical History:   Procedure Laterality Date    WISDOM TOOTH EXTRACTION        No Known Allergies   Social History     Tobacco Use    Smoking status: Never     Passive exposure: Never    Smokeless tobacco: Never   Substance Use Topics    Alcohol use: No      Wt Readings from Last 1 Encounters:   24 137 kg (302 lb)           Physical Exam    Airway        Mallampati: III   TM distance: > 3 FB   Neck ROM: full   Mouth opening: > 3 cm    Respiratory Devices and Support         Dental       (+) Minor Abnormalities - some fillings, tiny chips      Cardiovascular   cardiovascular exam normal          Pulmonary   pulmonary exam normal                OUTSIDE LABS:  CBC:   Lab Results   Component Value Date    WBC 6.8 2021    HGB 17.0 2021    HCT 47.6 2021     2021     BMP:   Lab Results   Component Value Date     2023     2021    POTASSIUM 4.0 2023    POTASSIUM 4.2 2021    CHLORIDE 105 2023    CHLORIDE 106 2021    CO2 26 2023    CO2 26 2021    BUN 13 2023    BUN 20 2021    CR 0.88 2023    CR 1.01 2021    GLC 93 2023    GLC 83 2021     COAGS: No results found for: \"PTT\", \"INR\", \"FIBR\"  POC: No results found for: \"BGM\", \"HCG\", \"HCGS\"  HEPATIC:   Lab Results   Component Value Date    ALBUMIN 4.3 2023    PROTTOTAL 8.0 2023    ALT 55 2023    AST 36 2023    ALKPHOS 82 2023    BILITOTAL 0.9 2023     OTHER:   Lab Results   Component Value Date    A1C 5.4 2019    MARY 9.4 2023    TSH 0.64 2023    CRP 0.2 2021    SED 1 2021       Anesthesia Plan    ASA Status:  2    NPO Status:  NPO " "Appropriate    Anesthesia Type: General.     - Airway: ETT   Induction: Intravenous, Propofol.   Maintenance: Balanced.        Consents    Anesthesia Plan(s) and associated risks, benefits, and realistic alternatives discussed. Questions answered and patient/representative(s) expressed understanding.     - Discussed: Risks, Benefits and Alternatives for BOTH SEDATION and the PROCEDURE were discussed     - Discussed with:  Patient      - Extended Intubation/Ventilatory Support Discussed: No.      - Patient is DNR/DNI Status: No     Use of blood products discussed: No .     Postoperative Care    Pain management: IV analgesics, Oral pain medications, Multi-modal analgesia.   PONV prophylaxis: Ondansetron (or other 5HT-3), Dexamethasone or Solumedrol, Background Propofol Infusion     Comments:               Alexis Montes MD    I have reviewed the pertinent notes and labs in the chart from the past 30 days and (re)examined the patient.  Any updates or changes from those notes are reflected in this note.              # Obesity: Estimated body mass index is 37.5 kg/m  as calculated from the following:    Height as of 8/16/24: 1.911 m (6' 3.25\").    Weight as of 8/16/24: 137 kg (302 lb).      "

## 2024-08-30 ENCOUNTER — ANESTHESIA (OUTPATIENT)
Dept: SURGERY | Facility: AMBULATORY SURGERY CENTER | Age: 39
End: 2024-08-30
Payer: COMMERCIAL

## 2024-08-30 ENCOUNTER — HOSPITAL ENCOUNTER (OUTPATIENT)
Facility: AMBULATORY SURGERY CENTER | Age: 39
Discharge: HOME | End: 2024-08-30
Attending: SURGERY | Admitting: SURGERY
Payer: COMMERCIAL

## 2024-08-30 VITALS
RESPIRATION RATE: 12 BRPM | OXYGEN SATURATION: 93 % | SYSTOLIC BLOOD PRESSURE: 130 MMHG | TEMPERATURE: 97 F | HEART RATE: 79 BPM | DIASTOLIC BLOOD PRESSURE: 90 MMHG

## 2024-08-30 DIAGNOSIS — K42.9 UMBILICAL HERNIA WITHOUT OBSTRUCTION AND WITHOUT GANGRENE: Primary | ICD-10-CM

## 2024-08-30 PROCEDURE — 49591 RPR AA HRN 1ST < 3 CM RDC: CPT

## 2024-08-30 PROCEDURE — G8907 PT DOC NO EVENTS ON DISCHARG: HCPCS

## 2024-08-30 PROCEDURE — 49591 RPR AA HRN 1ST < 3 CM RDC: CPT | Performed by: NURSE ANESTHETIST, CERTIFIED REGISTERED

## 2024-08-30 PROCEDURE — 49591 RPR AA HRN 1ST < 3 CM RDC: CPT | Performed by: STUDENT IN AN ORGANIZED HEALTH CARE EDUCATION/TRAINING PROGRAM

## 2024-08-30 PROCEDURE — G8918 PT W/O PREOP ORDER IV AB PRO: HCPCS

## 2024-08-30 PROCEDURE — G8916 PT W IV AB GIVEN ON TIME: HCPCS

## 2024-08-30 PROCEDURE — 49591 RPR AA HRN 1ST < 3 CM RDC: CPT | Performed by: SURGERY

## 2024-08-30 PROCEDURE — 49592 RPR AA HRN 1ST < 3 NCR/STRN: CPT

## 2024-08-30 DEVICE — COMPOSITE VENTRAL PATCH, POLYESTER & POLYGLYCOLIC-LACTIC ACID PATCH WITH ABSORBABLE COLLAGEN FILM
Type: IMPLANTABLE DEVICE | Site: ABDOMEN | Status: FUNCTIONAL
Brand: PARIETEX

## 2024-08-30 RX ORDER — DEXAMETHASONE SODIUM PHOSPHATE 4 MG/ML
4 INJECTION, SOLUTION INTRA-ARTICULAR; INTRALESIONAL; INTRAMUSCULAR; INTRAVENOUS; SOFT TISSUE
Status: DISCONTINUED | OUTPATIENT
Start: 2024-08-30 | End: 2024-08-31 | Stop reason: HOSPADM

## 2024-08-30 RX ORDER — PROPOFOL 10 MG/ML
INJECTION, EMULSION INTRAVENOUS PRN
Status: DISCONTINUED | OUTPATIENT
Start: 2024-08-30 | End: 2024-08-30

## 2024-08-30 RX ORDER — LIDOCAINE 40 MG/G
CREAM TOPICAL
Status: DISCONTINUED | OUTPATIENT
Start: 2024-08-30 | End: 2024-08-31 | Stop reason: HOSPADM

## 2024-08-30 RX ORDER — NALOXONE HYDROCHLORIDE 0.4 MG/ML
0.1 INJECTION, SOLUTION INTRAMUSCULAR; INTRAVENOUS; SUBCUTANEOUS
Status: DISCONTINUED | OUTPATIENT
Start: 2024-08-30 | End: 2024-08-31 | Stop reason: HOSPADM

## 2024-08-30 RX ORDER — FENTANYL CITRATE 50 UG/ML
INJECTION, SOLUTION INTRAMUSCULAR; INTRAVENOUS PRN
Status: DISCONTINUED | OUTPATIENT
Start: 2024-08-30 | End: 2024-08-30

## 2024-08-30 RX ORDER — FENTANYL CITRATE 50 UG/ML
25 INJECTION, SOLUTION INTRAMUSCULAR; INTRAVENOUS EVERY 5 MIN PRN
Status: DISCONTINUED | OUTPATIENT
Start: 2024-08-30 | End: 2024-08-31 | Stop reason: HOSPADM

## 2024-08-30 RX ORDER — ACETAMINOPHEN 325 MG/1
975 TABLET ORAL ONCE
Status: COMPLETED | OUTPATIENT
Start: 2024-08-30 | End: 2024-08-30

## 2024-08-30 RX ORDER — OXYCODONE HYDROCHLORIDE 5 MG/1
5-10 TABLET ORAL EVERY 4 HOURS PRN
Qty: 10 TABLET | Refills: 0 | Status: SHIPPED | OUTPATIENT
Start: 2024-08-30

## 2024-08-30 RX ORDER — FENTANYL CITRATE 50 UG/ML
50 INJECTION, SOLUTION INTRAMUSCULAR; INTRAVENOUS EVERY 5 MIN PRN
Status: DISCONTINUED | OUTPATIENT
Start: 2024-08-30 | End: 2024-08-31 | Stop reason: HOSPADM

## 2024-08-30 RX ORDER — SODIUM CHLORIDE, SODIUM LACTATE, POTASSIUM CHLORIDE, CALCIUM CHLORIDE 600; 310; 30; 20 MG/100ML; MG/100ML; MG/100ML; MG/100ML
INJECTION, SOLUTION INTRAVENOUS CONTINUOUS PRN
Status: DISCONTINUED | OUTPATIENT
Start: 2024-08-30 | End: 2024-08-30

## 2024-08-30 RX ORDER — PROPOFOL 10 MG/ML
INJECTION, EMULSION INTRAVENOUS CONTINUOUS PRN
Status: DISCONTINUED | OUTPATIENT
Start: 2024-08-30 | End: 2024-08-30

## 2024-08-30 RX ORDER — ONDANSETRON 2 MG/ML
INJECTION INTRAMUSCULAR; INTRAVENOUS PRN
Status: DISCONTINUED | OUTPATIENT
Start: 2024-08-30 | End: 2024-08-30

## 2024-08-30 RX ORDER — CEFAZOLIN SODIUM IN 0.9 % NACL 3 G/100 ML
3 INTRAVENOUS SOLUTION, PIGGYBACK (ML) INTRAVENOUS
Status: COMPLETED | OUTPATIENT
Start: 2024-08-30 | End: 2024-08-30

## 2024-08-30 RX ORDER — SODIUM CHLORIDE, SODIUM LACTATE, POTASSIUM CHLORIDE, CALCIUM CHLORIDE 600; 310; 30; 20 MG/100ML; MG/100ML; MG/100ML; MG/100ML
INJECTION, SOLUTION INTRAVENOUS CONTINUOUS
Status: DISCONTINUED | OUTPATIENT
Start: 2024-08-30 | End: 2024-08-31 | Stop reason: HOSPADM

## 2024-08-30 RX ORDER — ONDANSETRON 2 MG/ML
4 INJECTION INTRAMUSCULAR; INTRAVENOUS EVERY 30 MIN PRN
Status: DISCONTINUED | OUTPATIENT
Start: 2024-08-30 | End: 2024-08-31 | Stop reason: HOSPADM

## 2024-08-30 RX ORDER — CEFAZOLIN SODIUM IN 0.9 % NACL 3 G/100 ML
3 INTRAVENOUS SOLUTION, PIGGYBACK (ML) INTRAVENOUS SEE ADMIN INSTRUCTIONS
Status: DISCONTINUED | OUTPATIENT
Start: 2024-08-30 | End: 2024-08-31 | Stop reason: HOSPADM

## 2024-08-30 RX ORDER — OXYCODONE HYDROCHLORIDE 5 MG/1
5 TABLET ORAL
Status: DISCONTINUED | OUTPATIENT
Start: 2024-08-30 | End: 2024-08-31 | Stop reason: HOSPADM

## 2024-08-30 RX ORDER — ONDANSETRON 4 MG/1
4 TABLET, ORALLY DISINTEGRATING ORAL EVERY 30 MIN PRN
Status: DISCONTINUED | OUTPATIENT
Start: 2024-08-30 | End: 2024-08-31 | Stop reason: HOSPADM

## 2024-08-30 RX ORDER — DEXAMETHASONE SODIUM PHOSPHATE 4 MG/ML
INJECTION, SOLUTION INTRA-ARTICULAR; INTRALESIONAL; INTRAMUSCULAR; INTRAVENOUS; SOFT TISSUE PRN
Status: DISCONTINUED | OUTPATIENT
Start: 2024-08-30 | End: 2024-08-30

## 2024-08-30 RX ORDER — BUPIVACAINE HYDROCHLORIDE AND EPINEPHRINE 5; 5 MG/ML; UG/ML
INJECTION, SOLUTION EPIDURAL; INTRACAUDAL; PERINEURAL PRN
Status: DISCONTINUED | OUTPATIENT
Start: 2024-08-30 | End: 2024-08-30 | Stop reason: HOSPADM

## 2024-08-30 RX ORDER — LIDOCAINE HYDROCHLORIDE 20 MG/ML
INJECTION, SOLUTION INFILTRATION; PERINEURAL PRN
Status: DISCONTINUED | OUTPATIENT
Start: 2024-08-30 | End: 2024-08-30

## 2024-08-30 RX ADMIN — FENTANYL CITRATE 100 MCG: 50 INJECTION, SOLUTION INTRAMUSCULAR; INTRAVENOUS at 11:45

## 2024-08-30 RX ADMIN — ACETAMINOPHEN 975 MG: 325 TABLET ORAL at 10:43

## 2024-08-30 RX ADMIN — DEXAMETHASONE SODIUM PHOSPHATE 4 MG: 4 INJECTION, SOLUTION INTRA-ARTICULAR; INTRALESIONAL; INTRAMUSCULAR; INTRAVENOUS; SOFT TISSUE at 11:48

## 2024-08-30 RX ADMIN — Medication 3 G: at 11:35

## 2024-08-30 RX ADMIN — SODIUM CHLORIDE, SODIUM LACTATE, POTASSIUM CHLORIDE, CALCIUM CHLORIDE: 600; 310; 30; 20 INJECTION, SOLUTION INTRAVENOUS at 11:25

## 2024-08-30 RX ADMIN — Medication 50 MG: at 11:46

## 2024-08-30 RX ADMIN — PROPOFOL 50 MCG/KG/MIN: 10 INJECTION, EMULSION INTRAVENOUS at 11:48

## 2024-08-30 RX ADMIN — ONDANSETRON 4 MG: 2 INJECTION INTRAMUSCULAR; INTRAVENOUS at 11:59

## 2024-08-30 RX ADMIN — LIDOCAINE HYDROCHLORIDE 100 MG: 20 INJECTION, SOLUTION INFILTRATION; PERINEURAL at 11:45

## 2024-08-30 RX ADMIN — PROPOFOL 100 MG: 10 INJECTION, EMULSION INTRAVENOUS at 11:48

## 2024-08-30 RX ADMIN — PROPOFOL 250 MG: 10 INJECTION, EMULSION INTRAVENOUS at 11:45

## 2024-08-30 NOTE — ANESTHESIA POSTPROCEDURE EVALUATION
Patient: Kiran Arroyo    Procedure: Procedure(s):  HERNIORRHAPHY, UMBILICAL, OPEN with mesh       Anesthesia Type:  General    Note:  Disposition: Outpatient   Postop Pain Control: Uneventful            Sign Out: Well controlled pain   PONV: No   Neuro/Psych: Uneventful            Sign Out: Acceptable/Baseline neuro status   Airway/Respiratory: Uneventful            Sign Out: Acceptable/Baseline resp. status   CV/Hemodynamics: Uneventful            Sign Out: Acceptable CV status; No obvious hypovolemia; No obvious fluid overload   Other NRE:    DID A NON-ROUTINE EVENT OCCUR?            Last vitals:  Vitals Value Taken Time   /90 08/30/24 1300   Temp 97  F (36.1  C) 08/30/24 1240   Pulse 79 08/30/24 1306   Resp 17 08/30/24 1306   SpO2 92 % 08/30/24 1306   Vitals shown include unfiled device data.    Electronically Signed By: Alexis Montes MD  August 30, 2024  1:55 PM

## 2024-08-30 NOTE — ANESTHESIA PROCEDURE NOTES
Airway       Patient location during procedure: OR       Procedure Start/Stop Times: 8/30/2024 11:50 AM  Staff -        CRNA: Rhianna Santiago APRN CRNA       Performed By: CRNA  Consent for Airway        Urgency: elective  Indications and Patient Condition       Indications for airway management: saurabh-procedural       Induction type:intravenous       Mask difficulty assessment: 2 - vent by mask + OA or adjuvant +/- NMBA    Final Airway Details       Final airway type: endotracheal airway       Successful airway: ETT - single  Endotracheal Airway Details        ETT size (mm): 8.0       Cuffed: yes       Successful intubation technique: video laryngoscopy       VL Blade Size: Glidescope 4       Grade View of Cords: 1       Adjucts: stylet       Position: Right       Measured from: gums/teeth       Secured at (cm): 23       Bite block used: None    Post intubation assessment        Placement verified by: capnometry, equal breath sounds and chest rise        Number of attempts at approach: 1       Secured with: silk tape       Ease of procedure: easy       Dentition: Intact and Unchanged    Medication(s) Administered   Medication Administration Time: 8/30/2024 11:50 AM

## 2024-08-30 NOTE — OP NOTE
Date of Service: 8/30/2024     STAFF SURGEON:  Roberto Gardner MD     ASSISTANT:  None.     PREOPERATIVE DIAGNOSIS:  umbilical hernia     POSTOPERATIVE DIAGNOSIS:  Same     NAME OF PROCEDURE(S): Open umbilical hernia repair with mesh     INDICATIONS FOR PROCEDURE:  The patient is a 39-year-old male with small fat filled reducible umbilical hernia, symptomatic.  I offered an open approach to repair.  Risks, benefits, terms discussed and consent obtained.     EBL: 2 cc    ANESTHESIA: General    COMPLICATIONS: None     DRAINS:  None.     SPECIMENS: None     IMPLANTS: Covidien parietex mesh 6.6 cm [place] placed intraperitoneal     OPERATIVE FINDINGS: Local hernia with chronically incarcerated peritoneal fatty tissue.  Hernia defect measured 1.3 cm.  I decided on mesh reinforcement even with the small defect due to his increased risk from weight.  Mesh placed as intraperitoneal underlay.     PROCEDURE DETAIL:  Following consent, the patient was brought from the preoperative holding area to the operating suite and laid in supine position. Abdomen prepped and draped in usual fashion. Time out performed and antibiotics given.              I began with a 4 cm curved infraumbilical incision with #15 blade. I dissected through subcutaneous tissues with cautery until I reached the level of the fascia. Hernia and contents encircled and the umbilical skin dissected from hernia. This revealed the herniated  tissue. This appeared as peritoneal fatty tissue only. This herniated fat was excised with cautery. Umbilical defect was about 1.3 cm so I selected the 6.6 cm size parietex mesh. This was inserted and elevated to the abdominal wall. I used interrupted 0 ethibond sutures to close the defect and incorporated the mesh tabs to hold in position. Umbilical skin tacked back down to fascia with 3-0 vicryl. Subcutaneous tissues closed with running 3-0 vicryl. Skin closed with running subcuticular 4-0 monocryl. Incision covered with  dermabond. Wound further dressed with gauze and tegaderm at the umbilicus. Final counts complete. Patient tolerated well and discharged to PACU in stable condition.           Roberto Gardner MD

## 2024-08-30 NOTE — ANESTHESIA CARE TRANSFER NOTE
Patient: Kiran Arroyo    Procedure: Procedure(s):  HERNIORRHAPHY, UMBILICAL, OPEN with mesh       Diagnosis: Umbilical hernia without obstruction and without gangrene [K42.9]  Diagnosis Additional Information: No value filed.    Anesthesia Type:   General     Note:    Oropharynx: oropharynx clear of all foreign objects and spontaneously breathing  Level of Consciousness: drowsy  Oxygen Supplementation: face mask  Level of Supplemental Oxygen (L/min / FiO2): 6  Independent Airway: airway patency satisfactory and stable  Dentition: dentition unchanged  Vital Signs Stable: post-procedure vital signs reviewed and stable  Report to RN Given: handoff report given  Patient transferred to: PACU    Handoff Report: Identifed the Patient, Identified the Reponsible Provider, Reviewed the pertinent medical history, Discussed the surgical course, Reviewed Intra-OP anesthesia mangement and issues during anesthesia, Set expectations for post-procedure period and Allowed opportunity for questions and acknowledgement of understanding  Vitals:  Vitals Value Taken Time   BP     Temp     Pulse     Resp     SpO2 96 % 08/30/24 1238   Vitals shown include unfiled device data.    Electronically Signed By: Rhianna Santiago, APRN CRNA  August 30, 2024  12:39 PM

## 2024-09-18 ENCOUNTER — OFFICE VISIT (OUTPATIENT)
Dept: SURGERY | Facility: CLINIC | Age: 39
End: 2024-09-18
Payer: COMMERCIAL

## 2024-09-18 VITALS — BODY MASS INDEX: 37.5 KG/M2 | WEIGHT: 302 LBS

## 2024-09-18 DIAGNOSIS — T81.89XA DELAYED SURGICAL WOUND HEALING, INITIAL ENCOUNTER: ICD-10-CM

## 2024-09-18 DIAGNOSIS — Z09 S/P UMBILICAL HERNIA REPAIR, FOLLOW-UP EXAM: Primary | ICD-10-CM

## 2024-09-18 PROCEDURE — 99212 OFFICE O/P EST SF 10 MIN: CPT | Performed by: SURGERY

## 2024-09-18 NOTE — PROGRESS NOTES
General Surgery Post Op    Pt returns for follow up visit s/p open umbilical hernia repair with mesh on 8/30/24.    Patient has been doing well, tolerating diet. Bowels moving well. Pain controlled.  Still taking things easy at work.  Did notice that the umbilical incision had formed a blister that ruptured and now has a larger scab.    Physical exam: Vitals: Wt 137 kg (302 lb)   BMI 37.50 kg/m    BMI= Body mass index is 37.5 kg/m .    Exam:  Constitutional: healthy, alert, and no distress  Gastrointestinal: Abdomen soft, non-tender. BS normal. No masses, organomegaly  Umbilical incision with some separation of the epidermis at least up to about 1 cm in elliptical shape.  There is some limited surrounding erythema of the skin with the scab itself but I do not see signs of cellulitis.  I do not feel any fluctuance beneath such as hematoma      Assessment:     ICD-10-CM    1. S/P umbilical hernia repair, follow-up exam  Z09       2. Delayed surgical wound healing, initial encounter  T81.89XA         Plan: Overall recovering well but ended up having this blister and now a larger scab with the umbilical incision.  Deeper tissue still intact so this seems to be involving epidermis only.  Advised to keep an antibiotic ointment and cover with bandage while this heals.  Will simply take longer for the new skin to come together.  If noticing new concerns we can check things out again.  Okay to start increasing activities as tolerated until back to normal activities and lifting with work.    Roberto Gardner MD

## 2024-09-18 NOTE — LETTER
9/18/2024      Kiran Arroyo  [address]  [place] [address]      Dear Colleague,    Thank you for referring your patient, Kiran Arroyo, to the [facility]. Please see a copy of my visit note below.    General Surgery Post Op    Pt returns for follow up visit s/p open umbilical hernia repair with mesh on 8/30/24.    Patient has been doing well, tolerating diet. Bowels moving well. Pain controlled.  Still taking things easy at work.  Did notice that the umbilical incision had formed a blister that ruptured and now has a larger scab.    Physical exam: Vitals: Wt 137 kg (302 lb)   BMI 37.50 kg/m    BMI= Body mass index is 37.5 kg/m .    Exam:  Constitutional: healthy, alert, and no distress  Gastrointestinal: Abdomen soft, non-tender. BS normal. No masses, organomegaly  Umbilical incision with some separation of the epidermis at least up to about 1 cm in elliptical shape.  There is some limited surrounding erythema of the skin with the scab itself but I do not see signs of cellulitis.  I do not feel any fluctuance beneath such as hematoma      Assessment:     ICD-10-CM    1. S/P umbilical hernia repair, follow-up exam  Z09       2. Delayed surgical wound healing, initial encounter  T81.89XA         Plan: Overall recovering well but ended up having this blister and now a larger scab with the umbilical incision.  Deeper tissue still intact so this seems to be involving epidermis only.  Advised to keep an antibiotic ointment and cover with bandage while this heals.  Will simply take longer for the new skin to come together.  If noticing new concerns we can check things out again.  Okay to start increasing activities as tolerated until back to normal activities and lifting with work.    Roberto Gardner MD      Again, thank you for allowing me to participate in the care of your patient.        Sincerely,        Roberto Gardner MD

## 2025-02-19 ENCOUNTER — OFFICE VISIT (OUTPATIENT)
Dept: FAMILY MEDICINE | Facility: CLINIC | Age: 40
End: 2025-02-19
Payer: COMMERCIAL

## 2025-02-19 VITALS
RESPIRATION RATE: 16 BRPM | HEART RATE: 104 BPM | BODY MASS INDEX: 38.3 KG/M2 | DIASTOLIC BLOOD PRESSURE: 80 MMHG | SYSTOLIC BLOOD PRESSURE: 132 MMHG | OXYGEN SATURATION: 96 % | HEIGHT: 75 IN | WEIGHT: 308 LBS | TEMPERATURE: 97.3 F

## 2025-02-19 DIAGNOSIS — T78.40XA ALLERGIC REACTION, INITIAL ENCOUNTER: ICD-10-CM

## 2025-02-19 DIAGNOSIS — Z98.52 S/P VASECTOMY: ICD-10-CM

## 2025-02-19 DIAGNOSIS — E66.812 CLASS 2 OBESITY DUE TO EXCESS CALORIES WITHOUT SERIOUS COMORBIDITY WITH BODY MASS INDEX (BMI) OF 38.0 TO 38.9 IN ADULT: ICD-10-CM

## 2025-02-19 DIAGNOSIS — Z00.00 ROUTINE GENERAL MEDICAL EXAMINATION AT A HEALTH CARE FACILITY: Primary | ICD-10-CM

## 2025-02-19 DIAGNOSIS — E66.09 CLASS 2 OBESITY DUE TO EXCESS CALORIES WITHOUT SERIOUS COMORBIDITY WITH BODY MASS INDEX (BMI) OF 38.0 TO 38.9 IN ADULT: ICD-10-CM

## 2025-02-19 DIAGNOSIS — Z80.0 FAMILY HISTORY OF COLON CANCER: ICD-10-CM

## 2025-02-19 PROCEDURE — 99395 PREV VISIT EST AGE 18-39: CPT | Performed by: FAMILY MEDICINE

## 2025-02-19 RX ORDER — ISOTRETINOIN 30 MG/1
60 CAPSULE ORAL DAILY
COMMUNITY
Start: 2025-01-09

## 2025-02-19 RX ORDER — EPINEPHRINE 0.3 MG/.3ML
0.3 INJECTION SUBCUTANEOUS PRN
Qty: 2 EACH | Refills: 1 | Status: SHIPPED | OUTPATIENT
Start: 2025-02-19

## 2025-02-19 SDOH — HEALTH STABILITY: PHYSICAL HEALTH: ON AVERAGE, HOW MANY DAYS PER WEEK DO YOU ENGAGE IN MODERATE TO STRENUOUS EXERCISE (LIKE A BRISK WALK)?: 2 DAYS

## 2025-02-19 ASSESSMENT — PAIN SCALES - GENERAL: PAINLEVEL_OUTOF10: NO PAIN (0)

## 2025-02-19 ASSESSMENT — SOCIAL DETERMINANTS OF HEALTH (SDOH): HOW OFTEN DO YOU GET TOGETHER WITH FRIENDS OR RELATIVES?: MORE THAN THREE TIMES A WEEK

## 2025-02-19 NOTE — PATIENT INSTRUCTIONS
"      Important Takeaway Points From This Visit:     Regarding Weight Loss:    Intro to Goal Setting:  As you know, being intentional is the only way to set, and achieve our personal goals. No one \"accidentally\" achieves a difficult goal. It requires:  Realistic planning  Reflection (what has and has not worked in the past)    Identifying our \"Limiters\" that  the way of achieving our goals  Personal traits  Physical conditions  Financial barriers  Lifestyle barriers    Creativity and problem solving to develop a plan to attack each limiter    Willingness to accept and make change (small and incremental)    Dedication  Continuing the plan even after the initial euphoria has passed, until passion kicks in.  Rule setting (ie. Self-parenting)  Mindfulness of our thoughts and actions.    Communication, understanding, and support from our family and friends    Mental toughness   Short memory for mistakes, learn and move on.    Remember daily successes, no matter how small. Store these in a mental bank for later.      First, set your goal weight. Be as specific as possible. It should also be measurable. An example would look something like:    \"I will reduce my weight by ___ lbs, to my goal weight of        lbs by ___/___/______).\"     Write this goal down. Hang it somewhere easily visible. Tell your closest/supportive friends and family members your goal. Actions like this help keep us accountable, which is especially important for adherence to our plan early on.    Now lets move on to the planning step. The plan to achieve your goal also needs to be specific        The \"Math\" Problem of Weight Loss:  To start, I would recommend going online, or using an camille (aCon, etc) to calculate your \"Basal Metabolic Rate\" (BMR). This is the average amount of energy (measured in calories) you burn each day. Eating this calculated amount of calories will maintain your current weight.     An example calculator can be " found at:  (https://www.calculator.net/bmr-calculator.html)    Your BMR is calculated using your age, gender, height, weight, and activity level. This is only an estimate.      This number will go down as you lose weight and increase in age. To emphasize this, play with the weight and age values in the calculator for yourself.    Your BMR will need to be adjusted from time to time during your weight loss journey. It is best to re-calculate this for yourself at least every ~3 months.     Checking your BMR is also a good place to start if you are hitting a plateau.      Use your calculated BMR as a starting point for your daily calorie needs.       However, to lose weight, we need to create a calorie deficit. Healthy weight loss, is typically considered to be no more than ~0.5 - 1 lbs per week.    1 lbs of fat is ~3500 calories. Divided by 7 days a week is ~500 calories. This is the daily calorie deficit you need to consistently lose ~1 lbs per week.     To loss 0.5 lbs weekly, this would be a ~250 calorie deficit.     Subtract the desired deficit (250-500 calories) from your estimated BMR. This is your daily calorie intake goal.  .    With this estimated daily calorie restriction, you can now create a meal plan for yourself.    Look at the nutrition labels of the foods you like to eat. Pay close attention to calories and serving size.    Continue eating foods you enjoy and are familiar with preparing, this will help with adherence.     I would not initially start with any drastic dietary pattern changes. Focus on portion sizes to meet your calorie goal.    Cut out any high calorie, low nutrition beverages/snacks (ie. Sports drinks, pop/soda, chips, etc)      Remember, weight loss is a marathon, not a sprint.     0.5 - 1 lbs of weight loss a week = 26 - 52 lbs lost in 1 year.     Your organs still need some calories to function.     You need essential nutrients, vitamins and minerals only obtainable from your diet.  "      Ideally, most of us would hope to only lose fat and excess skin tissue. However, caloric restriction will likely cause lose of muscle mass to some degree. Expect this.  Certain individuals may see higher amounts of weight loss early on. This rate of weight loss typically decreases after just a few weeks.      Just like the math problem to lose weight, lets quickly do the math on gaining weight:   ~100 calories extra on a daily bases is ~36,500 calories/year = ~10 lbs of weight gain per year.  Not all foods are created equal, and calorie content does not equal feeling full.   How easy is it to go over? For reference  100 calories is:    1/3 of 1 slice of dominos pizza  1/4 cup macaroni  3 cheese cubes   < 1 M[employer] peanut butter cup  < 2 Oreos  3/4 of a can of pop/soda  4 oz of wine    1 apple or banana  25 carrots or strawberries  129 blue berries  1 16 oz pack of spinach        Additionally, I do recommend some level of a structured exercise regiment if you are not already incorporating this.    This will help raise your BMR. Allowing for quicker weight loss, or a higher daily caloric intake.     It has many other benefits including improved mood, concentration, strength, fitness, as well as heart, brain, and other organ health, etc.    Start super easy if you have not already been doing this. Understand there is a learning curve.    If planning on using a gym, pool, or other public facility:  Start by driving to and walking around the location. Note the following:  Travel time  Ease of parking  Hours of operation  Busy times  Floor plan  Transition time (change, shower, etc)  Focus on adjusting to the routine of going.   Go the same days/times weekly.  \"Protect\" these times from other obligations.  Note how you would transition/flow around the facility.  Acclimatize to working out in public.   Note the gym culture  Work on mental toughness, body image, and self confidence.  Let disappointments go " "immediately  Remember your daily wins/big goal accomplishments.  Pay attention to automatic negative thoughts and counter them.  Determine a work-out plan that suits you and your interests.  Practice just a few repetitions of the exercises in the work-out plan focusing on good form.  Focus on transitioning between exercises throughout the workout.  Finally, start a work out regiment focused on steady, gradual, progressive overload.  Record your progress.  Have a plan when going, don't just decide what to do that day, or upon arrival.  Focus on measuring duration if doing aerobic exercises like running, or walking.  Focus on weight, or number of repetitions if doing weight training.    If planning on exercising out at home:  Start by doing an inventory of your home equipment.  Make sure it is safe and in good working order  Determine if you are missing any needed equipment necessary for your proposed exercise plan and consider purchasing if affordable.   Set up a work-out space if able, with limited distractions.  Focus on adjusting to the routine of exercising.   Exercise the same days/times weekly.  \"Protect\" these times from other obligations, this may be harder to do at home.  The rest is the same as going to a gym, as above.    When incorporating an exercise plan, it is sometimes best to measure waist circumference for the first 1-2 months rather than weight to track progress. Your weight may not change much as you build more dense, lean muscle. You should still be losing fat though. Waist circumference reflects these changes better and can help with positive feedback to continue your plan.          The \"Behavior\" Problem of Weight Loss:  Just creating a calorie restricted meal plan and deciding how to incorporate exercise can be a tedious task; however, sticking to the plan can be the real difficult part for a number of reasons:  Biology  Physical disability (Pain, injury, heart/lung conditions, " "etc)  Prescribed medications for other health conditions  Comfort foods  Disinhibition  Hunger (stephen/gatherer instincts)  Poor sleep habits  Alcohol, THC, other substances  Stress    Marketing - Food companies are actively performing psychological experiments on how to get you to buy their products  Colored packaging  Family friendly mascots/characters  Shelf placement (height and on end caps)      Limited time availability (Red Oreos)        Smiling Customers / attractive photos of people using their products  Celebrity endorsements          Health Claims, Natural Products  Hidden / small nutrition labels  \"Sales\", \"Rewards\" points/programs  Prizes (Toys)  Rebates (Box Tops, mail in offers)    < 1 billion dollars / 25 years / 131k schools in the  / 500 students per school  < $0.61 cents per student per year  A single box of cereal is ~$4 or more  Cheerios alone makes [employer] >18 billion dollars a year.      Family  Shared meals / meal prep  Multiple shoppers resulting in unauthorized beverages/snacks brought into the home  Miscommunication about protected exercise time  Other obligations (sports, birthdays, Holidays, random /school closures)  Did I say kids?    Friends / Cultural  Happy hour / Eating out  Social events / Activities with fewer food options/unknown calorie count  Huge, home dinner plates  \"Finish your plate\" mentality, \"there's starving kids in ______ country.\"    Unexpected personal illnesses/injury    Travel    Work  Irregular work schedules  Deadlines  Long hours, early mornings  Availability of break room snacks / pot lucks  Availability of your own snacks when working from home.  Stress eating    This is a non-comprehensive list of what I like to call \"Limiters\". These are barriers between you and your goal. They can be biological, physical, financial, psychological, social/customs, etc.        This is when reflection, mindfulness, creativity, problem solving, mental toughness, " and good ole trial and error are important. Everyone's limiters are different, and everyone's solution may not look the same.     Here are some of my examples you can use to combat these types of limiters. Not all of these strategies will work for everyone. Grab a highlighter and casi which ones might be a good fit for you and try them out!     If you have any of your own strategies/protocols to share, please message me on [employer] so I can update this document!     Cultural / Home Meals  Buying smaller plates to naturally help reduce your portion size.  Put away leftovers prior to sitting down to eat. This can be helpful to avoid going back for seconds.  Avoid fluids during meal time if able. This can help slow down your pace of eating.  Stay well hydrated between meals. Don't let your body confuse hunger for thirst.  Increase fiber content in meals/supplements can help you feel delcid quicker.  Communicate with everyone in the household so they are on the same page.   Avoid bringing snacks into the house.  Allow your significant other the same work out time you need  Include your family in the exercise plan  Family walks  Sign up for 5ks      Avoid Marketing   Shop online for food if able. Either try to pick it up at the front of the store, or have it delivered to the your home to avoid impulse purchases.  If shopping in the store, stick to the outside edges of the store where the fresh fruits, vegetables, meats, etc are. Be mindful of the [employer]!  Create a list of meals you commonly eat and their ingredients to make shopping quicker.  Set favorites in your camille if able to set up meals for rapid check out/shopping.  Take over the grocery shopping for the family.      Eating out:  Avoid it if you can.  When you are eating out at a restaurant, ask for the to go box at the same time you place your order. When the food comes, put half of your meal immediately in the box and set it to the side to take home with you for  a meal on another day. (Out of sight out of mind).  Ask for a substitution for a healthier side.  Ask for half the usual amount of fries/bread that come with the meal.      Healthcare Resources:  We can have you meet with a dietician if desired to help develop a meal plan.  If you can afford it, hiring a  or  can be a great resource.  Medications (Discussed below) can be prescribed by myself and other healthcare providers.  If desired, weight loss surgery, or a referral to our weight loss specialty clinic can be considered. To have this discussion ask for a referral to [facility]. This option is not right for everyone. There are many hurdles that must be passed prior to undergoing surgery as well.          Medications  There are many medications that can help with weight loss. The most common pills that are used are called:  Phentermine  Topiramate  Naltrexone  Bupropion.     These medications can be used individually, or in some cases together, for weight loss.    There are also injectable medications which have been found to be very effective for weight loss. Their main side effects are nausea and constipation. They should also not be used in patient's with a personal, or family history of Multiple Endocrine Neoplasia (MEN).     Historically, they can be hard to find (on back order frequently), and costly. It is not uncommon for these injectable medications to cost $1,000 -1,500 monthly if having to pay out of pocket. A list of some of these medications, their dosing schedule, and release date is included below (RED are brands with an indication for weight loss, BLACK are brands with an indication for diabetes):  Wegovy (semaglutide) - Weekly shot - FDA Approved 2021  Ozempic (semaglutide) - Weekly shot - FDA Approved 2017  Trulicity (Dulaglutide) - Weekly shot - FDA Approved 2014  Mounjaro (Tirzepatide) - Weekly shot - FDA Approved 2022  Zepbound (Tirzepatide) - Weekly shot -  FDA Approved 2023  Saxenda (Liraglutide) - Daily shot - FDA Approved 2014  Victoza (Liraglutide) - Daily shot - FDA Approved 2010    I recommend you call your insurance and ask which of the above medications, if any, are covered by your plan, and the cost to you.  Once we know what is covered, we can discuss the specifics of each medication (side effects, dosing, etc) to determine which is the best option for you.     I hope this information is helpful.   Patient Education   Preventive Care Advice   This is general advice given by our system to help you stay healthy. However, your care team may have specific advice just for you. Please talk to your care team about your preventive care needs.  Nutrition  Eat 5 or more servings of fruits and vegetables each day.  Try wheat bread, brown rice and whole grain pasta (instead of white bread, rice, and pasta).  Get enough calcium and vitamin D. Check the label on foods and aim for 100% of the RDA (recommended daily allowance).  Lifestyle  Exercise at least 150 minutes each week  (30 minutes a day, 5 days a week).  Do muscle strengthening activities 2 days a week. These help control your weight and prevent disease.  No smoking.  Wear sunscreen to prevent skin cancer.  Have a dental exam and cleaning every 6 months.  Yearly exams  See your health care team every year to talk about:  Any changes in your health.  Any medicines your care team has prescribed.  Preventive care, family planning, and ways to prevent chronic diseases.  Shots (vaccines)   HPV shots (up to age 26), if you've never had them before.  Hepatitis B shots (up to age 59), if you've never had them before.  COVID-19 shot: Get this shot when it's due.  Flu shot: Get a flu shot every year.  Tetanus shot: Get a tetanus shot every 10 years.  Pneumococcal, hepatitis A, and RSV shots: Ask your care team if you need these based on your risk.  Shingles shot (for age 50 and up)  General health tests  Diabetes  screening:  Starting at age 35, Get screened for diabetes at least every 3 years.  If you are younger than age 35, ask your care team if you should be screened for diabetes.  Cholesterol test: At age 39, start having a cholesterol test every 5 years, or more often if advised.  Bone density scan (DEXA): At age 50, ask your care team if you should have this scan for osteoporosis (brittle bones).  Hepatitis C: Get tested at least once in your life.  STIs (sexually transmitted infections)  Before age 24: Ask your care team if you should be screened for STIs.  After age 24: Get screened for STIs if you're at risk. You are at risk for STIs (including HIV) if:  You are sexually active with more than one person.  You don't use condoms every time.  You or a partner was diagnosed with a sexually transmitted infection.  If you are at risk for HIV, ask about PrEP medicine to prevent HIV.  Get tested for HIV at least once in your life, whether you are at risk for HIV or not.  Cancer screening tests  Cervical cancer screening: If you have a cervix, begin getting regular cervical cancer screening tests starting at age 21.  Breast cancer scan (mammogram): If you've ever had breasts, begin having regular mammograms starting at age 40. This is a scan to check for breast cancer.  Colon cancer screening: It is important to start screening for colon cancer at age 45.  Have a colonoscopy test every 10 years (or more often if you're at risk) Or, ask your provider about stool tests like a FIT test every year or Cologuard test every 3 years.  To learn more about your testing options, visit:   .  For help making a decision, visit:   https://bit.ly/ks37621.  Prostate cancer screening test: If you have a prostate, ask your care team if a prostate cancer screening test (PSA) at age 55 is right for you.  Lung cancer screening: If you are a current or former smoker ages 50 to 80, ask your care team if ongoing lung cancer screenings are right for  you.  For informational purposes only. Not to replace the advice of your health care provider. Copyright   2023 [facility]. All rights reserved. Clinically reviewed by the [facility]. 51wan [id number] - REV 01/24.  Learning About Stress  What is stress?     Stress is your body's response to a hard situation. Your body can have a physical, emotional, or mental response. Stress is a fact of life for most people, and it affects everyone differently. What causes stress for you may not be stressful for someone else.  A lot of things can cause stress. You may feel stress when you go on a job interview, take a test, or run a race. This kind of short-term stress is normal and even useful. It can help you if you need to work hard or react quickly. For example, stress can help you finish an important job on time.  Long-term stress is caused by ongoing stressful situations or events. Examples of long-term stress include long-term health problems, ongoing problems at work, or conflicts in your family. Long-term stress can harm your health.  How does stress affect your health?  When you are stressed, your body responds as though you are in danger. It makes hormones that speed up your heart, make you breathe faster, and give you a burst of energy. This is called the fight-or-flight stress response. If the stress is over quickly, your body goes back to normal and no harm is done.  But if stress happens too often or lasts too long, it can have bad effects. Long-term stress can make you more likely to get sick, and it can make symptoms of some diseases worse. If you tense up when you are stressed, you may develop neck, shoulder, or low back pain. Stress is linked to high blood pressure and heart disease.  Stress also harms your emotional health. It can make you fraire, tense, or depressed. Your relationships may suffer, and you may not do well at work or school.  What can you do to manage  stress?  You can try these things to help manage stress:   Do something active. Exercise or activity can help reduce stress. Walking is a great way to get started. Even everyday activities such as housecleaning or yard work can help.  Try yoga or toan chi. These techniques combine exercise and meditation. You may need some training at first to learn them.  Do something you enjoy. For example, listen to music or go to a movie. Practice your hobby or do volunteer work.  Meditate. This can help you relax, because you are not worrying about what happened before or what may happen in the future.  Do guided imagery. Imagine yourself in any setting that helps you feel calm. You can use online videos, books, or a teacher to guide you.  Do breathing exercises. For example:  From a standing position, bend forward from the waist with your knees slightly bent. Let your arms dangle close to the floor.  Breathe in slowly and deeply as you return to a standing position. Roll up slowly and lift your head last.  Hold your breath for just a few seconds in the standing position.  Breathe out slowly and bend forward from the waist.  Let your feelings out. Talk, laugh, cry, and express anger when you need to. Talking with supportive friends or family, a counselor, or a micheline leader about your feelings is a healthy way to relieve stress. Avoid discussing your feelings with people who make you feel worse.  Write. It may help to write about things that are bothering you. This helps you find out how much stress you feel and what is causing it. When you know this, you can find better ways to cope.  What can you do to prevent stress?  You might try some of these things to help prevent stress:  Manage your time. This helps you find time to do the things you want and need to do.  Get enough sleep. Your body recovers from the stresses of the day while you are sleeping.  Get support. Your family, friends, and community can make a difference in how  "you experience stress.  Limit your news feed. Avoid or limit time on social media or news that may make you feel stressed.  Do something active. Exercise or activity can help reduce stress. Walking is a great way to get started.  Where can you learn more?  Go to https://www.AQH.net/patiented  Enter N032 in the search box to learn more about \"Learning About Stress.\"  Current as of: October 24, 2023  Content Version: 14.3    2024 [employer].   Care instructions adapted under license by your healthcare professional. If you have questions about a medical condition or this instruction, always ask your healthcare professional. [employer] disclaims any warranty or liability for your use of this information.       "

## 2025-02-19 NOTE — PROGRESS NOTES
Preventive Care Visit  [facility] JONG Butt MD, Family Medicine  Feb 19, 2025    Assessment & Plan   1. Routine general medical examination at a health care facility (Primary)  Discussed personal health and safety. Routine screenings ordered as below. Appropriate anticipatory guidance, vaccinations, and health screening recommendations delivered according to the USPSTF and other appropriate society guidelines. Kiran reports understanding and in agreement with this mutually agreed upon plan.    Today's Orders:  - Lipid panel reflex to direct LDL Non-fasting; Future  - Comprehensive metabolic panel; Future  - CBC with Platelets & Differential; Future  - TSH; Future  - Hemoglobin A1c; Future  - Testosterone Free and Total; Future    2. Allergic reaction, initial encounter  Refills given.  - EPINEPHrine (ANY BX GENERIC EQUIV) 0.3 MG/0.3ML injection 2-pack; Inject 0.3 mLs (0.3 mg) into the muscle as needed for anaphylaxis.  Dispense: 2 each; Refill: 1    3. S/P vasectomy  Never completed semen analysis.  - Semen Analysis Post Vasectomy; Future    4. Family history of colon cancer  Family history of colon cancer in 2 grandparents at relatively young age.  Recommend early screening.  - Colonoscopy Screening  Referral; Future    5. Class 2 obesity due to excess calories without serious comorbidity with body mass index (BMI) of 38.0 to 38.9 in adult  Noted. Encourage diet and exercise changes for weight loss and overall health improvement. Discussed BMR calculator, reading nutrition labels, being intentional and scheduling exercise, meal prep/shopping tips, discussing biological reasons for frequent failure to maintain weight loss, and medication therapy. Patient will check with insurance about coverage for GLP-1.         Follow-up Visit   Expected date:  Feb 19, 2026 (Approximate)      Follow Up Appointment Details:     Follow-up with whom?: PCP    Follow-Up for what?: Adult  Preventive    How?: In Person               Eddy Butt MD  [facility]    Disclaimer: This note consists of symbols derived from keyboarding, dictation and/or voice recognition software. As a result, there may be errors in the script that have gone undetected. Please consider this when interpreting information found in this chart.    The longitudinal plan of care for the diagnosis(es)/condition(s) as documented were addressed during this visit. Due to the added complexity in care, I will continue to support Kiran in the subsequent management and with ongoing continuity of care.    Subjective   Kiran is a 39 year old, presenting for the following:        Physical        2/19/2025     1:55 PM   Additional Questions   Roomed by AG   Accompanied by self        HPI  - wants to talk about appetite, has had problems controlling it, has been more than usual  - last time here was tested for diabetes, cholesterol, etc and wants to see where he is    Health Care Directive  Patient does not have a Health Care Directive: Discussed advance care planning with patient; however, patient declined at this time.      2/19/2025   General Health   How would you rate your overall physical health? (!) FAIR   Feel stress (tense, anxious, or unable to sleep) To some extent   (!) STRESS CONCERN      2/19/2025   Nutrition   Three or more servings of calcium each day? (!) I DON'T KNOW   Diet: Other   If other, please elaborate: will discuss   How many servings of fruit and vegetables per day? (!) 2-3   How many sweetened beverages each day? (!) 2         2/19/2025   Exercise   Days per week of moderate/strenous exercise 2 days   (!) EXERCISE CONCERN      2/19/2025   Social Factors   Frequency of gathering with friends or relatives More than three times a week   Worry food won't last until get money to buy more No   Food not last or not have enough money for food? Yes   Do you have housing? (Housing  is defined as stable permanent housing and does not include staying ouside in a car, in a tent, in an abandoned building, in an overnight shelter, or couch-surfing.) Yes   Are you worried about losing your housing? No   Lack of transportation? No   Unable to get utilities (heat,electricity)? No   (!) FOOD SECURITY CONCERN PRESENT      2/19/2025   Dental   Dentist two times every year? Yes         4/18/2024   TB Screening   Were you born outside of the [place]? No     Today's PHQ-2 Score:       2/19/2025     1:50 PM   PHQ-2 ( 1999 [employer])   Q1: Little interest or pleasure in doing things 1   Q2: Feeling down, depressed or hopeless 1   PHQ-2 Score 2    Q1: Little interest or pleasure in doing things Several days   Q2: Feeling down, depressed or hopeless Several days   PHQ-2 Score 2       Patient-reported           2/19/2025   Substance Use   Alcohol more than 3/day or more than 7/wk Not Applicable   Do you use any other substances recreationally? No     Social History     Tobacco Use    Smoking status: Never     Passive exposure: Never    Smokeless tobacco: Never   Vaping Use    Vaping status: Never Used   Substance Use Topics    Alcohol use: No    Drug use: No         2/19/2025   STI Screening   New sexual partner(s) since last STI/HIV test? No     Reviewed and updated as needed this visit by Provider   Tobacco  Allergies  Meds  Problems  Med Hx  Surg Hx  Fam Hx        Social Hx Reviewed    Past Medical History:   Diagnosis Date    Obesity 9/9/2016     Past Surgical History:   Procedure Laterality Date    HERNIORRHAPHY UMBILICAL N/A 08/30/2024    Procedure: HERNIORRHAPHY, UMBILICAL, OPEN with mesh;  Surgeon: Roberto Gardner MD;  Location: [facility] OR    VASECTOMY  09/2023    WISDOM TOOTH EXTRACTION           Review of Systems  Constitutional, HEENT, cardiovascular, pulmonary, GI, , musculoskeletal, neuro, skin, endocrine and psych systems are negative, except as otherwise noted.     Objective    Exam  /80  "(BP Location: Left arm, Patient Position: Chair, Cuff Size: Adult Large)   Pulse 104   Temp 97.3  F (36.3  C) (Temporal)   Resp 16   Ht 1.9 m (6' 2.8\")   Wt (!) 139.7 kg (308 lb)   SpO2 96%   BMI 38.70 kg/m     Estimated body mass index is 38.7 kg/m  as calculated from the following:    Height as of this encounter: 1.9 m (6' 2.8\").    Weight as of this encounter: 139.7 kg (308 lb).    Physical Exam  Constitutional:       Appearance: He is obese.   HENT:      Head: Normocephalic and atraumatic.      Right Ear: Tympanic membrane, ear canal and external ear normal. There is no impacted cerumen.      Left Ear: Tympanic membrane, ear canal and external ear normal. There is no impacted cerumen.      Nose: Nose normal. No congestion.      Mouth/Throat:      Pharynx: Oropharynx is clear. No oropharyngeal exudate or posterior oropharyngeal erythema.   Eyes:      General:         Right eye: No discharge.         Left eye: No discharge.      Extraocular Movements: Extraocular movements intact.      Conjunctiva/sclera: Conjunctivae normal.      Pupils: Pupils are equal, round, and reactive to light.   Cardiovascular:      Rate and Rhythm: Normal rate and regular rhythm.      Heart sounds: Normal heart sounds. No murmur heard.     No friction rub.   Pulmonary:      Effort: Pulmonary effort is normal. No respiratory distress.      Breath sounds: Normal breath sounds. No wheezing or rhonchi.   Abdominal:      General: Abdomen is flat. There is no distension.      Palpations: Abdomen is soft. There is no mass.      Tenderness: There is no abdominal tenderness. There is no guarding.   Musculoskeletal:         General: No swelling.      Cervical back: Normal range of motion and neck supple. No tenderness.      Right lower leg: No edema.      Left lower leg: No edema.   Lymphadenopathy:      Cervical: No cervical adenopathy.   Skin:     General: Skin is warm.      Findings: No rash.   Neurological:      General: No focal " deficit present.      Mental Status: He is alert.      Cranial Nerves: No cranial nerve deficit.      Motor: No weakness.      Coordination: Coordination normal.      Gait: Gait normal.   Psychiatric:         Mood and Affect: Mood normal.         Behavior: Behavior normal.         Thought Content: Thought content normal.         Judgment: Judgment normal.           Labs: Pending    Signed Electronically by: Eddy Butt MD

## 2025-02-20 ENCOUNTER — LAB (OUTPATIENT)
Dept: LAB | Facility: CLINIC | Age: 40
End: 2025-02-20
Payer: COMMERCIAL

## 2025-02-20 DIAGNOSIS — Z00.00 ROUTINE GENERAL MEDICAL EXAMINATION AT A HEALTH CARE FACILITY: ICD-10-CM

## 2025-02-20 DIAGNOSIS — Z98.52 S/P VASECTOMY: ICD-10-CM

## 2025-02-20 LAB
ALBUMIN SERPL BCG-MCNC: 4.7 G/DL (ref 3.5–5.2)
ALP SERPL-CCNC: 103 U/L (ref 40–150)
ALT SERPL W P-5'-P-CCNC: 50 U/L (ref 0–70)
ANION GAP SERPL CALCULATED.3IONS-SCNC: 14 MMOL/L (ref 7–15)
AST SERPL W P-5'-P-CCNC: 42 U/L (ref 0–45)
BASOPHILS # BLD AUTO: 0 10E3/UL (ref 0–0.2)
BASOPHILS NFR BLD AUTO: 0 %
BILIRUB SERPL-MCNC: 0.7 MG/DL
BUN SERPL-MCNC: 13.4 MG/DL (ref 6–20)
CALCIUM SERPL-MCNC: 9.2 MG/DL (ref 8.8–10.4)
CHLORIDE SERPL-SCNC: 103 MMOL/L (ref 98–107)
CHOLEST SERPL-MCNC: 178 MG/DL
CREAT SERPL-MCNC: 0.76 MG/DL (ref 0.67–1.17)
EGFRCR SERPLBLD CKD-EPI 2021: >90 ML/MIN/1.73M2
EOSINOPHIL # BLD AUTO: 0.2 10E3/UL (ref 0–0.7)
EOSINOPHIL NFR BLD AUTO: 2 %
ERYTHROCYTE [DISTWIDTH] IN BLOOD BY AUTOMATED COUNT: 12.5 % (ref 10–15)
EST. AVERAGE GLUCOSE BLD GHB EST-MCNC: 108 MG/DL
FASTING STATUS PATIENT QL REPORTED: ABNORMAL
FASTING STATUS PATIENT QL REPORTED: ABNORMAL
GLUCOSE SERPL-MCNC: 89 MG/DL (ref 70–99)
HBA1C MFR BLD: 5.4 % (ref 0–5.6)
HCO3 SERPL-SCNC: 21 MMOL/L (ref 22–29)
HCT VFR BLD AUTO: 48.2 % (ref 40–53)
HDLC SERPL-MCNC: 27 MG/DL
HGB BLD-MCNC: 17.4 G/DL (ref 13.3–17.7)
IMM GRANULOCYTES # BLD: 0 10E3/UL
IMM GRANULOCYTES NFR BLD: 0 %
LDLC SERPL CALC-MCNC: 90 MG/DL
LYMPHOCYTES # BLD AUTO: 2.3 10E3/UL (ref 0.8–5.3)
LYMPHOCYTES NFR BLD AUTO: 34 %
MCH RBC QN AUTO: 30.7 PG (ref 26.5–33)
MCHC RBC AUTO-ENTMCNC: 36.1 G/DL (ref 31.5–36.5)
MCV RBC AUTO: 85 FL (ref 78–100)
MONOCYTES # BLD AUTO: 0.7 10E3/UL (ref 0–1.3)
MONOCYTES NFR BLD AUTO: 11 %
NEUTROPHILS # BLD AUTO: 3.6 10E3/UL (ref 1.6–8.3)
NEUTROPHILS NFR BLD AUTO: 53 %
NONHDLC SERPL-MCNC: 151 MG/DL
PLATELET # BLD AUTO: 223 10E3/UL (ref 150–450)
POTASSIUM SERPL-SCNC: 3.8 MMOL/L (ref 3.4–5.3)
PROT SERPL-MCNC: 7.3 G/DL (ref 6.4–8.3)
RBC # BLD AUTO: 5.66 10E6/UL (ref 4.4–5.9)
SHBG SERPL-SCNC: 29 NMOL/L (ref 11–80)
SODIUM SERPL-SCNC: 138 MMOL/L (ref 135–145)
TRIGL SERPL-MCNC: 305 MG/DL
TSH SERPL DL<=0.005 MIU/L-ACNC: 0.48 UIU/ML (ref 0.3–4.2)
WBC # BLD AUTO: 6.9 10E3/UL (ref 4–11)

## 2025-02-23 LAB
TESTOST FREE SERPL-MCNC: 8.27 NG/DL
TESTOST SERPL-MCNC: 380 NG/DL (ref 240–950)

## 2025-02-27 ENCOUNTER — TELEPHONE (OUTPATIENT)
Dept: GASTROENTEROLOGY | Facility: CLINIC | Age: 40
End: 2025-02-27
Payer: COMMERCIAL

## 2025-02-27 NOTE — TELEPHONE ENCOUNTER
"Endoscopy Scheduling Screen    Have you had any respiratory illness or flu-like symptoms in the last 10 days?  No    What is your communication preference for Instructions and/or Bowel Prep?   MyChart    What insurance is in the chart?  Other:  [facility]    Ordering/Referring Provider: Eddy Butt MD   (If ordering provider performs procedure, schedule with ordering provider unless otherwise instructed. )    BMI: Estimated body mass index is 38.7 kg/m  as calculated from the following:    Height as of 2/19/25: 1.9 m (6' 2.8\").    Weight as of 2/19/25: 139.7 kg (308 lb).     Sedation Ordered  moderate sedation.   If patient BMI > 50 do not schedule in [facility].    If patient BMI > 45 do not schedule at [facility].    Are you taking methadone or Suboxone?  NO, No RN review required.    Have you been diagnosed and are being treated for severe PTSD or severe anxiety?  NO, No RN review required.    Are you taking any prescription medications for pain 3 or more times per week?   NO, No RN review required.    Do you have a history of malignant hyperthermia?  No    (Females) Are you currently pregnant?   No     Have you been diagnosed or told you have pulmonary hypertension?   No    Do you have an LVAD?  No    Have you been told you have moderate to severe sleep apnea?  No.    Have you been told you have COPD, asthma, or any other lung disease?  No    Has your doctor ordered any cardiac tests like echo, angiogram, stress test, ablation, or EKG, that you have not completed yet?  No    Do you  have a history of any heart conditions?  No     Have you ever had or are you waiting for an organ transplant?  No. Continue scheduling, no site restrictions.    Have you had a stroke or transient ischemic attack (TIA aka \"mini stroke\") in the last 2 years?   No.    Have you been diagnosed with or been told you have cirrhosis of the liver?   No.    Are you currently on dialysis?   No    Do you need assistance transferring?   No    BMI: " "Estimated body mass index is 38.7 kg/m  as calculated from the following:    Height as of 2/19/25: 1.9 m (6' 2.8\").    Weight as of 2/19/25: 139.7 kg (308 lb).     Is patients BMI > 40 and scheduling location UP?  No    Do you take an injectable or oral medication for weight loss or diabetes (excluding insulin)?  No    Do you take the medication Naltrexone?  No    Do you take blood thinners?  No       Prep   Are you currently on dialysis or do you have chronic kidney disease?  No    Do you have a diagnosis of diabetes?  No    Do you have a diagnosis of cystic fibrosis (CF)?  No    On a regular basis do you go 3 -5 days between bowel movements?  No    BMI > 40?  No    Preferred Pharmacy:    [facility] [address] [place], [place] - [address] [address] [place] [address]  [id number] [address] [address]  Phone: [phone] Fax: [phone]      Final Scheduling Details     Procedure scheduled  Colonoscopy    Surgeon:  Rose Marie     Date of procedure:  03/31/2025     Pre-OP / PAC:   No - Not required for this site.    Location  [facility] - [facility] - Patient preference.    Sedation   Moderate Sedation - Per order.      Patient Reminders:   You will receive a call from a Nurse to review instructions and health history.  This assessment must be completed prior to your procedure.  Failure to complete the Nurse assessment may result in the procedure being cancelled.      On the day of your procedure, please designate an adult(s) who can drive you home stay with you for the next 24 hours. The medicines used in the exam will make you sleepy. You will not be able to drive.      You cannot take public transportation, ride share services, or non-medical taxi service without a responsible caregiver.  Medical transport services are allowed with the requirement that a responsible caregiver will receive you at your destination.  We require that drivers and caregivers are confirmed prior to your procedure.  "

## 2025-03-13 ENCOUNTER — TELEPHONE (OUTPATIENT)
Dept: GASTROENTEROLOGY | Facility: CLINIC | Age: 40
End: 2025-03-13
Payer: COMMERCIAL

## 2025-03-13 NOTE — TELEPHONE ENCOUNTER
Bowel Prep Review:  Disclaimer: No call was made to the patient.     Standard Miralax bowel prep.    Recommended due to standard bowel prep.   Instructions were sent via [employer].       Rachel Becerra LPN  Endoscopy Procedure Pre Assessment

## 2025-03-27 ENCOUNTER — TELEPHONE (OUTPATIENT)
Dept: GASTROENTEROLOGY | Facility: CLINIC | Age: 40
End: 2025-03-27
Payer: COMMERCIAL

## 2025-03-31 ENCOUNTER — HOSPITAL ENCOUNTER (OUTPATIENT)
Facility: AMBULATORY SURGERY CENTER | Age: 40
Discharge: HOME | End: 2025-03-31
Attending: INTERNAL MEDICINE | Admitting: INTERNAL MEDICINE
Payer: COMMERCIAL

## 2025-03-31 VITALS
TEMPERATURE: 97.6 F | RESPIRATION RATE: 18 BRPM | BODY MASS INDEX: 38.7 KG/M2 | SYSTOLIC BLOOD PRESSURE: 132 MMHG | HEART RATE: 98 BPM | DIASTOLIC BLOOD PRESSURE: 100 MMHG | WEIGHT: 308 LBS | OXYGEN SATURATION: 96 %

## 2025-03-31 LAB — COLONOSCOPY: NORMAL

## 2025-03-31 PROCEDURE — 45385 COLONOSCOPY W/LESION REMOVAL: CPT

## 2025-03-31 PROCEDURE — 88305 TISSUE EXAM BY PATHOLOGIST: CPT | Performed by: PATHOLOGY

## 2025-03-31 PROCEDURE — 45380 COLONOSCOPY AND BIOPSY: CPT | Mod: XS

## 2025-03-31 PROCEDURE — G8918 PT W/O PREOP ORDER IV AB PRO: HCPCS

## 2025-03-31 PROCEDURE — G8907 PT DOC NO EVENTS ON DISCHARG: HCPCS

## 2025-03-31 RX ORDER — FLUMAZENIL 0.1 MG/ML
0.2 INJECTION, SOLUTION INTRAVENOUS
Status: ACTIVE | OUTPATIENT
Start: 2025-03-31 | End: 2025-03-31

## 2025-03-31 RX ORDER — ONDANSETRON 2 MG/ML
4 INJECTION INTRAMUSCULAR; INTRAVENOUS
Status: DISCONTINUED | OUTPATIENT
Start: 2025-03-31 | End: 2025-04-01 | Stop reason: HOSPADM

## 2025-03-31 RX ORDER — NALOXONE HYDROCHLORIDE 0.4 MG/ML
0.4 INJECTION, SOLUTION INTRAMUSCULAR; INTRAVENOUS; SUBCUTANEOUS
Status: DISCONTINUED | OUTPATIENT
Start: 2025-03-31 | End: 2025-04-01 | Stop reason: HOSPADM

## 2025-03-31 RX ORDER — LIDOCAINE 40 MG/G
CREAM TOPICAL
Status: DISCONTINUED | OUTPATIENT
Start: 2025-03-31 | End: 2025-04-01 | Stop reason: HOSPADM

## 2025-03-31 RX ORDER — PROCHLORPERAZINE MALEATE 10 MG
10 TABLET ORAL EVERY 6 HOURS PRN
Status: DISCONTINUED | OUTPATIENT
Start: 2025-03-31 | End: 2025-04-01 | Stop reason: HOSPADM

## 2025-03-31 RX ORDER — NALOXONE HYDROCHLORIDE 0.4 MG/ML
0.2 INJECTION, SOLUTION INTRAMUSCULAR; INTRAVENOUS; SUBCUTANEOUS
Status: DISCONTINUED | OUTPATIENT
Start: 2025-03-31 | End: 2025-04-01 | Stop reason: HOSPADM

## 2025-03-31 RX ORDER — ONDANSETRON 4 MG/1
4 TABLET, ORALLY DISINTEGRATING ORAL EVERY 6 HOURS PRN
Status: DISCONTINUED | OUTPATIENT
Start: 2025-03-31 | End: 2025-04-01 | Stop reason: HOSPADM

## 2025-03-31 RX ORDER — FENTANYL CITRATE 50 UG/ML
INJECTION, SOLUTION INTRAMUSCULAR; INTRAVENOUS PRN
Status: DISCONTINUED | OUTPATIENT
Start: 2025-03-31 | End: 2025-03-31 | Stop reason: HOSPADM

## 2025-03-31 RX ORDER — ONDANSETRON 2 MG/ML
4 INJECTION INTRAMUSCULAR; INTRAVENOUS EVERY 6 HOURS PRN
Status: DISCONTINUED | OUTPATIENT
Start: 2025-03-31 | End: 2025-04-01 | Stop reason: HOSPADM

## 2025-03-31 NOTE — H&P
[facility] Anesthesia Pre-op History and Physical    Kiran Arroyo MRN# [id number]   Age: 39 year old YOB: 1985      Date of Exam 3/31/2025         Primary care provider: Eddy Butt         Chief Complaint and/or Reason for Procedure:     Family history of colon cancer - 3 grandparents       Active problem list:     Patient Active Problem List    Diagnosis Date Noted    Umbilical hernia without obstruction and without gangrene 06/26/2024     Priority: Medium    Allergic reaction, initial encounter 04/19/2023     Priority: Medium    Rosacea 04/19/2023     Priority: Medium    Screen for STD (sexually transmitted disease) 04/19/2023     Priority: Medium    Hepatic steatosis 03/02/2021     Priority: Medium    Dermatosis 02/11/2021     Priority: Medium    Obesity 09/09/2016     Priority: Medium    Impingement syndrome of right shoulder 02/13/2014     Priority: Medium    Sprain of right acromioclavicular joint 02/13/2014     Priority: Medium            Medications (include herbals and vitamins):   Any Plavix use in the last 7 days? No     Current Outpatient Medications   Medication Sig Dispense Refill    ISOtretinoin (ABSORICA) 30 MG capsule Take 60 mg by mouth daily.      EPINEPHrine (ANY BX GENERIC EQUIV) 0.3 MG/0.3ML injection 2-pack Inject 0.3 mLs (0.3 mg) into the muscle as needed for anaphylaxis. 2 each 1     Current Facility-Administered Medications   Medication Dose Route Frequency Provider Last Rate Last Admin    lidocaine (LMX4) kit   Topical Q1H PRN Milagros Trotter,         lidocaine 1 % 0.1-1 mL  0.1-1 mL Other Q1H PRN Milagros Trotter DO        ondansetron (ZOFRAN) injection 4 mg  4 mg Intravenous Once PRN Milagros Trotter DO        sodium chloride (PF) 0.9% PF flush 3 mL  3 mL Intracatheter Q8H RYAN Milagros Trotter DO        sodium chloride (PF) 0.9% PF flush 3 mL  3 mL Intracatheter q1 min prn Milagros Trotter DO                 Allergies:    Not on  File  Allergy to Latex? No  Allergy to tape?   No  Intolerances:             Physical Exam:   All vitals have been reviewed  Patient Vitals for the past 8 hrs:   BP Temp Temp src Resp SpO2 Weight   03/31/25 0917 (!) 146/104 97.6  F (36.4  C) Temporal 16 97 % (!) 139.7 kg (308 lb)     No intake/output data recorded.  Lungs:   no increased work of breathing     Cardiovascular:   RRR             Lab / Radiology Results:         Anesthetic risk and/or ASA classification:     2  Milagros Trotter, DO

## 2025-04-02 LAB
PATH REPORT.COMMENTS IMP SPEC: NORMAL
PATH REPORT.COMMENTS IMP SPEC: NORMAL
PATH REPORT.FINAL DX SPEC: NORMAL
PATH REPORT.GROSS SPEC: NORMAL
PATH REPORT.MICROSCOPIC SPEC OTHER STN: NORMAL
PATH REPORT.RELEVANT HX SPEC: NORMAL
PHOTO IMAGE: NORMAL

## 2025-04-08 ENCOUNTER — PATIENT OUTREACH (OUTPATIENT)
Dept: GASTROENTEROLOGY | Facility: CLINIC | Age: 40
End: 2025-04-08
Payer: COMMERCIAL

## 2025-04-08 PROBLEM — D12.6 ADENOMATOUS COLON POLYP: Status: ACTIVE | Noted: 2025-04-08

## (undated) DEVICE — SOL WATER IRRIG 1000ML BOTTLE 07139-09

## (undated) DEVICE — SOL NACL 0.9% IRRIG 1000ML BOTTLE 07138-09

## (undated) DEVICE — SU MONOCRYL 4-0 PS-2 18" UND Y496G

## (undated) DEVICE — GLOVE BIOGEL PI MICRO INDICATOR UNDERGLOVE SZ 7.5 48975

## (undated) DEVICE — ESU PENCIL SMOKE EVAC W/ROCKER SWITCH 0703-047-000

## (undated) DEVICE — DRAPE IOBAN INCISE 23X17" 6650EZ

## (undated) DEVICE — SUCTION CANISTER MEDIVAC LINER 1500ML W/LID 65651-515

## (undated) DEVICE — PACK MINOR SBA15MIFSE

## (undated) DEVICE — SU ETHIBOND 0 CT-2 30" X412H

## (undated) DEVICE — DRAPE LAP W/ARMBOARD 29410

## (undated) DEVICE — PREP CHLORAPREP 26ML TINTED ORANGE  260815

## (undated) DEVICE — SU VICRYL 3-0 SH 27" UND J416H

## (undated) DEVICE — SU DERMABOND DHV12

## (undated) DEVICE — DRSG TEGADERM 4X4 3/4" 1626W

## (undated) DEVICE — GLOVE BIOGEL PI ULTRATOUCH G SZ 7.5 42175

## (undated) RX ORDER — FENTANYL CITRATE 50 UG/ML
INJECTION, SOLUTION INTRAMUSCULAR; INTRAVENOUS
Status: DISPENSED
Start: 2025-03-31

## (undated) RX ORDER — PROPOFOL 10 MG/ML
INJECTION, EMULSION INTRAVENOUS
Status: DISPENSED
Start: 2024-08-30

## (undated) RX ORDER — CEFAZOLIN SODIUM 2 G/50ML
SOLUTION INTRAVENOUS
Status: DISPENSED
Start: 2024-08-30

## (undated) RX ORDER — CEFAZOLIN SODIUM 1 G/3ML
INJECTION, POWDER, FOR SOLUTION INTRAMUSCULAR; INTRAVENOUS
Status: DISPENSED
Start: 2024-08-30

## (undated) RX ORDER — FENTANYL CITRATE 50 UG/ML
INJECTION, SOLUTION INTRAMUSCULAR; INTRAVENOUS
Status: DISPENSED
Start: 2024-08-30

## (undated) RX ORDER — BUPIVACAINE HYDROCHLORIDE AND EPINEPHRINE 5; 5 MG/ML; UG/ML
INJECTION, SOLUTION EPIDURAL; INTRACAUDAL; PERINEURAL
Status: DISPENSED
Start: 2024-08-30

## (undated) RX ORDER — ACETAMINOPHEN 325 MG/1
TABLET ORAL
Status: DISPENSED
Start: 2024-08-30